# Patient Record
Sex: FEMALE | Race: WHITE | NOT HISPANIC OR LATINO | URBAN - METROPOLITAN AREA
[De-identification: names, ages, dates, MRNs, and addresses within clinical notes are randomized per-mention and may not be internally consistent; named-entity substitution may affect disease eponyms.]

---

## 2017-05-15 ENCOUNTER — OUTPATIENT (OUTPATIENT)
Dept: OUTPATIENT SERVICES | Facility: HOSPITAL | Age: 42
LOS: 1 days | End: 2017-05-15
Payer: COMMERCIAL

## 2017-05-15 ENCOUNTER — APPOINTMENT (OUTPATIENT)
Dept: ULTRASOUND IMAGING | Facility: IMAGING CENTER | Age: 42
End: 2017-05-15

## 2017-05-15 ENCOUNTER — APPOINTMENT (OUTPATIENT)
Dept: MAMMOGRAPHY | Facility: IMAGING CENTER | Age: 42
End: 2017-05-15

## 2017-05-15 DIAGNOSIS — Z00.8 ENCOUNTER FOR OTHER GENERAL EXAMINATION: ICD-10-CM

## 2017-05-15 DIAGNOSIS — Z98.89 OTHER SPECIFIED POSTPROCEDURAL STATES: Chronic | ICD-10-CM

## 2017-05-15 DIAGNOSIS — Z12.31 ENCOUNTER FOR SCREENING MAMMOGRAM FOR MALIGNANT NEOPLASM OF BREAST: ICD-10-CM

## 2017-05-15 DIAGNOSIS — N60.19 DIFFUSE CYSTIC MASTOPATHY OF UNSPECIFIED BREAST: ICD-10-CM

## 2017-05-15 PROCEDURE — 77063 BREAST TOMOSYNTHESIS BI: CPT

## 2017-05-15 PROCEDURE — 77067 SCR MAMMO BI INCL CAD: CPT

## 2017-05-15 PROCEDURE — 76641 ULTRASOUND BREAST COMPLETE: CPT

## 2018-07-18 ENCOUNTER — OUTPATIENT (OUTPATIENT)
Dept: OUTPATIENT SERVICES | Facility: HOSPITAL | Age: 43
LOS: 1 days | Discharge: HOME | End: 2018-07-18

## 2018-07-18 DIAGNOSIS — Z98.89 OTHER SPECIFIED POSTPROCEDURAL STATES: Chronic | ICD-10-CM

## 2018-07-19 DIAGNOSIS — F31.9 BIPOLAR DISORDER, UNSPECIFIED: ICD-10-CM

## 2018-07-19 DIAGNOSIS — F60.3 BORDERLINE PERSONALITY DISORDER: ICD-10-CM

## 2018-08-29 ENCOUNTER — INPATIENT (INPATIENT)
Facility: HOSPITAL | Age: 43
LOS: 8 days | Discharge: HOME | End: 2018-09-07
Attending: PSYCHIATRY & NEUROLOGY | Admitting: PSYCHIATRY & NEUROLOGY
Payer: COMMERCIAL

## 2018-08-29 VITALS
TEMPERATURE: 99 F | SYSTOLIC BLOOD PRESSURE: 149 MMHG | HEART RATE: 144 BPM | OXYGEN SATURATION: 100 % | DIASTOLIC BLOOD PRESSURE: 75 MMHG | RESPIRATION RATE: 16 BRPM

## 2018-08-29 DIAGNOSIS — F31.10 BIPOLAR DISORDER, CURRENT EPISODE MANIC WITHOUT PSYCHOTIC FEATURES, UNSPECIFIED: ICD-10-CM

## 2018-08-29 DIAGNOSIS — Z98.89 OTHER SPECIFIED POSTPROCEDURAL STATES: Chronic | ICD-10-CM

## 2018-08-29 DIAGNOSIS — F31.62 BIPOLAR DISORDER, CURRENT EPISODE MIXED, MODERATE: ICD-10-CM

## 2018-08-29 LAB
ALBUMIN SERPL ELPH-MCNC: 4.2 G/DL — SIGNIFICANT CHANGE UP (ref 3.3–5)
ALP SERPL-CCNC: 110 U/L — SIGNIFICANT CHANGE UP (ref 40–120)
ALT FLD-CCNC: 20 U/L — SIGNIFICANT CHANGE UP (ref 4–33)
AMPHET UR-MCNC: NEGATIVE — SIGNIFICANT CHANGE UP
APAP SERPL-MCNC: < 15 UG/ML — LOW (ref 15–25)
APPEARANCE UR: CLEAR — SIGNIFICANT CHANGE UP
AST SERPL-CCNC: 24 U/L — SIGNIFICANT CHANGE UP (ref 4–32)
BARBITURATES UR SCN-MCNC: NEGATIVE — SIGNIFICANT CHANGE UP
BASOPHILS # BLD AUTO: 0.06 K/UL — SIGNIFICANT CHANGE UP (ref 0–0.2)
BASOPHILS NFR BLD AUTO: 0.4 % — SIGNIFICANT CHANGE UP (ref 0–2)
BENZODIAZ UR-MCNC: NEGATIVE — SIGNIFICANT CHANGE UP
BILIRUB SERPL-MCNC: 0.4 MG/DL — SIGNIFICANT CHANGE UP (ref 0.2–1.2)
BILIRUB UR-MCNC: NEGATIVE — SIGNIFICANT CHANGE UP
BLOOD UR QL VISUAL: NEGATIVE — SIGNIFICANT CHANGE UP
BUN SERPL-MCNC: 13 MG/DL — SIGNIFICANT CHANGE UP (ref 7–23)
CALCIUM SERPL-MCNC: 10.1 MG/DL — SIGNIFICANT CHANGE UP (ref 8.4–10.5)
CANNABINOIDS UR-MCNC: NEGATIVE — SIGNIFICANT CHANGE UP
CHLORIDE SERPL-SCNC: 98 MMOL/L — SIGNIFICANT CHANGE UP (ref 98–107)
CO2 SERPL-SCNC: 25 MMOL/L — SIGNIFICANT CHANGE UP (ref 22–31)
COCAINE METAB.OTHER UR-MCNC: NEGATIVE — SIGNIFICANT CHANGE UP
COLOR SPEC: SIGNIFICANT CHANGE UP
CREAT SERPL-MCNC: 0.88 MG/DL — SIGNIFICANT CHANGE UP (ref 0.5–1.3)
EOSINOPHIL # BLD AUTO: 0.1 K/UL — SIGNIFICANT CHANGE UP (ref 0–0.5)
EOSINOPHIL NFR BLD AUTO: 0.7 % — SIGNIFICANT CHANGE UP (ref 0–6)
ETHANOL BLD-MCNC: < 10 MG/DL — SIGNIFICANT CHANGE UP
GLUCOSE SERPL-MCNC: 122 MG/DL — HIGH (ref 70–99)
GLUCOSE UR-MCNC: NEGATIVE — SIGNIFICANT CHANGE UP
HCG SERPL-ACNC: < 5 MIU/ML — SIGNIFICANT CHANGE UP
HCG UR-SCNC: NEGATIVE — SIGNIFICANT CHANGE UP
HCT VFR BLD CALC: 40 % — SIGNIFICANT CHANGE UP (ref 34.5–45)
HGB BLD-MCNC: 12.7 G/DL — SIGNIFICANT CHANGE UP (ref 11.5–15.5)
IMM GRANULOCYTES # BLD AUTO: 0.13 # — SIGNIFICANT CHANGE UP
IMM GRANULOCYTES NFR BLD AUTO: 0.9 % — SIGNIFICANT CHANGE UP (ref 0–1.5)
KETONES UR-MCNC: NEGATIVE — SIGNIFICANT CHANGE UP
LEUKOCYTE ESTERASE UR-ACNC: NEGATIVE — SIGNIFICANT CHANGE UP
LITHIUM SERPL-MCNC: 1.12 MMOL/L — SIGNIFICANT CHANGE UP (ref 0.6–1.2)
LYMPHOCYTES # BLD AUTO: 17.1 % — SIGNIFICANT CHANGE UP (ref 13–44)
LYMPHOCYTES # BLD AUTO: 2.57 K/UL — SIGNIFICANT CHANGE UP (ref 1–3.3)
MCHC RBC-ENTMCNC: 28.6 PG — SIGNIFICANT CHANGE UP (ref 27–34)
MCHC RBC-ENTMCNC: 31.8 % — LOW (ref 32–36)
MCV RBC AUTO: 90.1 FL — SIGNIFICANT CHANGE UP (ref 80–100)
METHADONE UR-MCNC: NEGATIVE — SIGNIFICANT CHANGE UP
MONOCYTES # BLD AUTO: 0.47 K/UL — SIGNIFICANT CHANGE UP (ref 0–0.9)
MONOCYTES NFR BLD AUTO: 3.1 % — SIGNIFICANT CHANGE UP (ref 2–14)
NEUTROPHILS # BLD AUTO: 11.68 K/UL — HIGH (ref 1.8–7.4)
NEUTROPHILS NFR BLD AUTO: 77.8 % — HIGH (ref 43–77)
NITRITE UR-MCNC: NEGATIVE — SIGNIFICANT CHANGE UP
NRBC # FLD: 0 — SIGNIFICANT CHANGE UP
OPIATES UR-MCNC: NEGATIVE — SIGNIFICANT CHANGE UP
OXYCODONE UR-MCNC: NEGATIVE — SIGNIFICANT CHANGE UP
PCP UR-MCNC: NEGATIVE — SIGNIFICANT CHANGE UP
PH UR: 7 — SIGNIFICANT CHANGE UP (ref 5–8)
PLATELET # BLD AUTO: 443 K/UL — HIGH (ref 150–400)
PMV BLD: 10.4 FL — SIGNIFICANT CHANGE UP (ref 7–13)
POTASSIUM SERPL-MCNC: 3.7 MMOL/L — SIGNIFICANT CHANGE UP (ref 3.5–5.3)
POTASSIUM SERPL-SCNC: 3.7 MMOL/L — SIGNIFICANT CHANGE UP (ref 3.5–5.3)
PROT SERPL-MCNC: 7 G/DL — SIGNIFICANT CHANGE UP (ref 6–8.3)
PROT UR-MCNC: NEGATIVE — SIGNIFICANT CHANGE UP
RBC # BLD: 4.44 M/UL — SIGNIFICANT CHANGE UP (ref 3.8–5.2)
RBC # FLD: 13.2 % — SIGNIFICANT CHANGE UP (ref 10.3–14.5)
SALICYLATES SERPL-MCNC: < 5 MG/DL — LOW (ref 15–30)
SODIUM SERPL-SCNC: 136 MMOL/L — SIGNIFICANT CHANGE UP (ref 135–145)
SP GR SPEC: 1.01 — SIGNIFICANT CHANGE UP (ref 1–1.04)
SP GR UR: 1.01 — SIGNIFICANT CHANGE UP (ref 1–1.03)
TSH SERPL-MCNC: 3.87 UIU/ML — SIGNIFICANT CHANGE UP (ref 0.27–4.2)
UROBILINOGEN FLD QL: NORMAL — SIGNIFICANT CHANGE UP
WBC # BLD: 15.01 K/UL — HIGH (ref 3.8–10.5)
WBC # FLD AUTO: 15.01 K/UL — HIGH (ref 3.8–10.5)

## 2018-08-29 PROCEDURE — 99285 EMERGENCY DEPT VISIT HI MDM: CPT | Mod: GC

## 2018-08-29 PROCEDURE — 99222 1ST HOSP IP/OBS MODERATE 55: CPT

## 2018-08-29 RX ORDER — LAMOTRIGINE 25 MG/1
100 TABLET, ORALLY DISINTEGRATING ORAL DAILY
Qty: 0 | Refills: 0 | Status: DISCONTINUED | OUTPATIENT
Start: 2018-09-02 | End: 2018-09-03

## 2018-08-29 RX ORDER — LAMOTRIGINE 25 MG/1
100 TABLET, ORALLY DISINTEGRATING ORAL DAILY
Qty: 0 | Refills: 0 | Status: DISCONTINUED | OUTPATIENT
Start: 2018-08-29 | End: 2018-09-07

## 2018-08-29 RX ORDER — HYDROXYZINE HCL 10 MG
50 TABLET ORAL EVERY 6 HOURS
Qty: 0 | Refills: 0 | Status: DISCONTINUED | OUTPATIENT
Start: 2018-09-02 | End: 2018-09-07

## 2018-08-29 RX ORDER — LITHIUM CARBONATE 300 MG/1
1200 TABLET, EXTENDED RELEASE ORAL AT BEDTIME
Qty: 0 | Refills: 0 | Status: DISCONTINUED | OUTPATIENT
Start: 2018-08-29 | End: 2018-09-07

## 2018-08-29 RX ORDER — LITHIUM CARBONATE 300 MG/1
1200 TABLET, EXTENDED RELEASE ORAL DAILY
Qty: 0 | Refills: 0 | Status: DISCONTINUED | OUTPATIENT
Start: 2018-09-02 | End: 2018-09-03

## 2018-08-29 RX ORDER — DIPHENHYDRAMINE HCL 50 MG
25 CAPSULE ORAL EVERY 6 HOURS
Qty: 0 | Refills: 0 | Status: DISCONTINUED | OUTPATIENT
Start: 2018-08-29 | End: 2018-09-07

## 2018-08-29 RX ORDER — DIPHENHYDRAMINE HCL 50 MG
50 CAPSULE ORAL EVERY 6 HOURS
Qty: 0 | Refills: 0 | Status: DISCONTINUED | OUTPATIENT
Start: 2018-09-02 | End: 2018-09-07

## 2018-08-29 RX ORDER — HALOPERIDOL DECANOATE 100 MG/ML
5 INJECTION INTRAMUSCULAR ONCE
Qty: 0 | Refills: 0 | Status: DISCONTINUED | OUTPATIENT
Start: 2018-08-29 | End: 2018-09-07

## 2018-08-29 RX ORDER — HALOPERIDOL DECANOATE 100 MG/ML
5 INJECTION INTRAMUSCULAR EVERY 6 HOURS
Qty: 0 | Refills: 0 | Status: DISCONTINUED | OUTPATIENT
Start: 2018-09-02 | End: 2018-09-07

## 2018-08-29 RX ORDER — DIPHENHYDRAMINE HCL 50 MG
50 CAPSULE ORAL EVERY 6 HOURS
Qty: 0 | Refills: 0 | Status: DISCONTINUED | OUTPATIENT
Start: 2018-08-29 | End: 2018-09-07

## 2018-08-29 RX ORDER — DULOXETINE HYDROCHLORIDE 30 MG/1
80 CAPSULE, DELAYED RELEASE ORAL DAILY
Qty: 0 | Refills: 0 | Status: DISCONTINUED | OUTPATIENT
Start: 2018-09-02 | End: 2018-09-03

## 2018-08-29 RX ORDER — OLANZAPINE 15 MG/1
5 TABLET, FILM COATED ORAL AT BEDTIME
Qty: 0 | Refills: 0 | Status: DISCONTINUED | OUTPATIENT
Start: 2018-08-29 | End: 2018-09-04

## 2018-08-29 RX ORDER — ALPRAZOLAM 0.25 MG
0.25 TABLET ORAL ONCE
Qty: 0 | Refills: 0 | Status: DISCONTINUED | OUTPATIENT
Start: 2018-08-29 | End: 2018-08-29

## 2018-08-29 RX ORDER — HALOPERIDOL DECANOATE 100 MG/ML
5 INJECTION INTRAMUSCULAR EVERY 6 HOURS
Qty: 0 | Refills: 0 | Status: DISCONTINUED | OUTPATIENT
Start: 2018-08-29 | End: 2018-08-29

## 2018-08-29 RX ORDER — DULOXETINE HYDROCHLORIDE 30 MG/1
80 CAPSULE, DELAYED RELEASE ORAL DAILY
Qty: 0 | Refills: 0 | Status: DISCONTINUED | OUTPATIENT
Start: 2018-08-29 | End: 2018-09-07

## 2018-08-29 RX ORDER — CLONAZEPAM 1 MG
0.5 TABLET ORAL
Qty: 0 | Refills: 0 | Status: DISCONTINUED | OUTPATIENT
Start: 2018-08-29 | End: 2018-09-01

## 2018-08-29 RX ORDER — SODIUM CHLORIDE 9 MG/ML
1000 INJECTION INTRAMUSCULAR; INTRAVENOUS; SUBCUTANEOUS ONCE
Qty: 0 | Refills: 0 | Status: COMPLETED | OUTPATIENT
Start: 2018-08-29 | End: 2018-08-29

## 2018-08-29 RX ORDER — HALOPERIDOL DECANOATE 100 MG/ML
5 INJECTION INTRAMUSCULAR EVERY 6 HOURS
Qty: 0 | Refills: 0 | Status: DISCONTINUED | OUTPATIENT
Start: 2018-08-29 | End: 2018-09-07

## 2018-08-29 RX ORDER — CLONAZEPAM 1 MG
0.5 TABLET ORAL ONCE
Qty: 0 | Refills: 0 | Status: DISCONTINUED | OUTPATIENT
Start: 2018-08-29 | End: 2018-08-29

## 2018-08-29 RX ADMIN — Medication 0.25 MILLIGRAM(S): at 03:54

## 2018-08-29 RX ADMIN — SODIUM CHLORIDE 1000 MILLILITER(S): 9 INJECTION INTRAMUSCULAR; INTRAVENOUS; SUBCUTANEOUS at 06:08

## 2018-08-29 RX ADMIN — LITHIUM CARBONATE 1200 MILLIGRAM(S): 300 TABLET, EXTENDED RELEASE ORAL at 20:48

## 2018-08-29 RX ADMIN — OLANZAPINE 5 MILLIGRAM(S): 15 TABLET, FILM COATED ORAL at 20:48

## 2018-08-29 RX ADMIN — HALOPERIDOL DECANOATE 5 MILLIGRAM(S): 100 INJECTION INTRAMUSCULAR at 20:48

## 2018-08-29 RX ADMIN — Medication 25 MILLIGRAM(S): at 20:48

## 2018-08-29 RX ADMIN — Medication 0.5 MILLIGRAM(S): at 20:48

## 2018-08-29 RX ADMIN — Medication 0.5 MILLIGRAM(S): at 13:05

## 2018-08-29 RX ADMIN — SODIUM CHLORIDE 1000 MILLILITER(S): 9 INJECTION INTRAMUSCULAR; INTRAVENOUS; SUBCUTANEOUS at 02:35

## 2018-08-29 RX ADMIN — Medication 1 MILLIGRAM(S): at 06:08

## 2018-08-29 NOTE — ED ADULT NURSE REASSESSMENT NOTE - NS ED NURSE REASSESS COMMENT FT1
Report receieved from night RN Benjamin.  at bedside to introduce day RN. Pt is calm, A&O x4 and aware they are waiting for bed at bedside.

## 2018-08-29 NOTE — ED PROVIDER NOTE - CARE PLAN
Principal Discharge DX:	Palpitations Principal Discharge DX:	Palpitations  Secondary Diagnosis:	Anxiety
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
## 2018-08-29 NOTE — ED BEHAVIORAL HEALTH ASSESSMENT NOTE - DESCRIPTION
Patient assessed for overwhelming anxiety. ED gave patient 0.25mg Xanax at 3:54am and Ativan at 06:08am with fluid bolus to address HR of 144bpm. HR has since decreased to 100bpm.  Vital Signs Last 24 Hrs  T(C): 37.3 (29 Aug 2018 06:36), Max: 37.3 (29 Aug 2018 00:10)  T(F): 99.2 (29 Aug 2018 06:36), Max: 99.2 (29 Aug 2018 06:36)  HR: 106 (29 Aug 2018 06:36) (100 - 144)  BP: 122/71 (29 Aug 2018 06:36) (114/81 - 149/75)  BP(mean): --  RR: 16 (29 Aug 2018 06:36) (16 - 16)  SpO2: 98% (29 Aug 2018 06:36) (98% - 100%) hearing loss, uterine polyps deaf since 10 weeks old, employed as mental health counselor, , domiciled w/  and 1 yo son

## 2018-08-29 NOTE — ED ADULT NURSE NOTE - OBJECTIVE STATEMENT
Pt received A&Ox4 to ED Rm 1 with c/o anxiety that has since subsided while being in the ED. States previous feeling of "impending death & like someone was trying to hurt her." States chronic intermit palp worse with anxiety. Denies SI, HI, auditory or visual hallucinations. States hx of bipolar, anxiety on lithium & cymbalta. RR equal & unlabored. Labs sent per MD orders. IV NS infusing well.  at bedside. Will monitor.

## 2018-08-29 NOTE — ED BEHAVIORAL HEALTH ASSESSMENT NOTE - CASE SUMMARY
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 Yari Gibbs (previously AOPD 2016- July 2018), PMHx hearing loss, BIB EMS to [facility] ED after patient called 911 for overwhelming anxiety with sense of death. Patient endorses acute worsening of delusions, high anxiety, and poses an imminent danger to self as she  is unable to care for self due to worsening psychosis, requires inpatient level of care for treatment and stabilization and will admitted 9.13

## 2018-08-29 NOTE — ED BEHAVIORAL HEALTH ASSESSMENT NOTE - SUMMARY
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 Yari Gibbs (previously AOPD 2016- July 2018), PMHx hearing loss, BIB EMS to [facility] ED after patient called 911 for overwhelming anxiety with sense of death. ED gave patient 0.25mg Xanax at 3:54am and Ativan at 06:08am with fluid bolus to address HR of 144bpm.   Patient has overwhelming anxiety 2/2 active psychosis, in the setting of dx’d bipolar 1 disorder with multiple prior hospitalizations. She requires inpatient psychiatric hospitalization for safety, stabilization, and medication management. Patient has insight into her illness and is agreeable to voluntary psychiatric inpatient admission.  - No need for 1:1 observation at this time (patient in good behavioral control and denying SIB/SI/HI)  - Continuing patient on current medication regimen: Lamictal 100mg QD, lithium 1200mg QD, and Cymbalta 80mg QD  - Medication changes per primary psychiatric inpatient team

## 2018-08-29 NOTE — ED BEHAVIORAL HEALTH ASSESSMENT NOTE - RISK ASSESSMENT
Patient at elevated risk due to dx of bipolar d/o and BPD, hx of multiple psych hospitalizations, psychosis despite medication compliance, global insomnia, severe anxiety, and potential hx of trauma. Risk is mitigated by compliance with medications, good therapeutic relationship, lack of hx of SIB/SA/HA, denial of current SI/SIB/HI, good support network, future-orientation, responsibility to family, sobriety, and engagement in employment.

## 2018-08-29 NOTE — ED BEHAVIORAL HEALTH ASSESSMENT NOTE - OTHER PAST PSYCHIATRIC HISTORY (INCLUDE DETAILS REGARDING ONSET, COURSE OF ILLNESS, INPATIENT/OUTPATIENT TREATMENT)
Bipolar D/O & BPD, no hx SA/SIB/HA, 2 past psychiatric hospitalizations (most recent 2016 [facility] 2W 08/01/2016-08/09/2016, post-partum manic episode), currently followed at outpatient [facility] bipolar clinic by Dr. Yari Gibbs (previously AOPD 2016- July 2018)

## 2018-08-29 NOTE — ED ADULT NURSE NOTE - NSIMPLEMENTINTERV_GEN_ALL_ED
Implemented All Universal Safety Interventions:  [place] to call system. Call bell, personal items and telephone within reach. Instruct patient to call for assistance. Room bathroom lighting operational. Non-slip footwear when patient is off stretcher. Physically safe environment: no spills, clutter or unnecessary equipment. Stretcher in lowest position, wheels locked, appropriate side rails in place.

## 2018-08-29 NOTE — ED PROVIDER NOTE - PMH
Bipolar affective disorder    Depression    Hearing impaired    Hearing loss sensory, bilateral    Polyp  of uterus

## 2018-08-29 NOTE — ED PROVIDER NOTE - ATTENDING CONTRIBUTION TO CARE
42F PMH deaf, bipolar, anxiety p/w sensation of feeling very anxious, scared, felt like she was going to die. Also felt like heart was racing. Also felt "irrational and delusional" but cant explain more. States she has felt similar in the past and ultimately required psych admission. Currently feeling much better and only currently feels very minimal anxiety. Tachycardic, other vitals wnl, exam as above. Well appearing. EKG sinus tach.   ddx: HR likely 2/2 anxiety vs. metabolic vs. dehydration  CBC, cmp, TSH, tox, hcg, lithium level. IVF, reassess.  Pt really wants to speak to [facility], especially because she ended up getting admitted w/ similar presentations. Likely medical clearance for further  eval.

## 2018-08-29 NOTE — ED ADULT TRIAGE NOTE - CHIEF COMPLAINT QUOTE
Pt arrives w/ c/o increased anxiety and overwhelming feelings of being in danger. Rpts last time this occurred she went into psychosis. Pt w/ history of bipolar disorder. Recent dose increase in lithium.  Denies SI/HI. Pt deaf w/ left ear hearing aide. Lip reader. .

## 2018-08-29 NOTE — ED BEHAVIORAL HEALTH ASSESSMENT NOTE - SUICIDE PROTECTIVE FACTORS
Responsibility to family and others/Positive therapeutic relationships/Future oriented/Fear of death or dying due to pain/suffering/Engaged in work or school/Identifies reasons for living/Supportive social network or family
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
## 2018-08-29 NOTE — ED BEHAVIORAL HEALTH ASSESSMENT NOTE - OTHER
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

## 2018-08-29 NOTE — ED PROVIDER NOTE - PROGRESS NOTE DETAILS
[place]: LAbs grossly wnl. Feeling much better, HR improved. Medically cleared for further BH eval. [place]: MC. Slightly more anxious. rediscussed w/ BH, still awaiting eval. Lina: Pt was signed out to me pending psych c/s. Pt is medically cleared. Hx of anxiety, psychosis, bipolar. Here with anxiety, pending psych eval. JOVANA Kee; Pt signed out to JOVANA kee from JOVANA culver, pt stable, resting at bedside, no other changes.

## 2018-08-29 NOTE — ED PROVIDER NOTE - PHYSICAL EXAMINATION
No clonus, rigidity, tremors, fasciculations. PERRL, EOMI, no nystagmus. Strength 5/5. Steady unassisted gait. Normal bowel sounds, skin temp/color.   [place]  no LE edema, normal equal distal pulses.

## 2018-08-29 NOTE — ED BEHAVIORAL HEALTH ASSESSMENT NOTE - PSYCHIATRIC ISSUES AND PLAN (INCLUDE STANDING AND PRN MEDICATION)
Continuing patient on current medication regimen: Lamictal 100mg QD, lithium 1200mg QD, and Cymbalta 80mg. PRN Benadryl 50mg (PO/IM), PRN Haldol 5mg (PO/IM) and PRN Ativan 2mg (PO/IM) available for management of severe agitation

## 2018-08-30 PROCEDURE — 99232 SBSQ HOSP IP/OBS MODERATE 35: CPT

## 2018-08-30 RX ORDER — TRAZODONE HCL 50 MG
50 TABLET ORAL AT BEDTIME
Qty: 0 | Refills: 0 | Status: DISCONTINUED | OUTPATIENT
Start: 2018-08-30 | End: 2018-08-31

## 2018-08-30 RX ADMIN — Medication 0.5 MILLIGRAM(S): at 09:55

## 2018-08-30 RX ADMIN — LAMOTRIGINE 100 MILLIGRAM(S): 25 TABLET, ORALLY DISINTEGRATING ORAL at 09:55

## 2018-08-30 RX ADMIN — LITHIUM CARBONATE 1200 MILLIGRAM(S): 300 TABLET, EXTENDED RELEASE ORAL at 21:24

## 2018-08-30 RX ADMIN — OLANZAPINE 5 MILLIGRAM(S): 15 TABLET, FILM COATED ORAL at 21:24

## 2018-08-30 RX ADMIN — DULOXETINE HYDROCHLORIDE 80 MILLIGRAM(S): 30 CAPSULE, DELAYED RELEASE ORAL at 09:55

## 2018-08-30 RX ADMIN — Medication 50 MILLIGRAM(S): at 21:24

## 2018-08-30 RX ADMIN — Medication 0.5 MILLIGRAM(S): at 21:24

## 2018-08-31 PROCEDURE — 90853 GROUP PSYCHOTHERAPY: CPT

## 2018-08-31 PROCEDURE — 90837 PSYTX W PT 60 MINUTES: CPT | Mod: 59

## 2018-08-31 PROCEDURE — 99232 SBSQ HOSP IP/OBS MODERATE 35: CPT | Mod: 25

## 2018-08-31 RX ORDER — TRAZODONE HCL 50 MG
50 TABLET ORAL AT BEDTIME
Qty: 0 | Refills: 0 | Status: DISCONTINUED | OUTPATIENT
Start: 2018-08-31 | End: 2018-09-07

## 2018-08-31 RX ADMIN — OLANZAPINE 5 MILLIGRAM(S): 15 TABLET, FILM COATED ORAL at 21:04

## 2018-08-31 RX ADMIN — Medication 0.5 MILLIGRAM(S): at 08:25

## 2018-08-31 RX ADMIN — LITHIUM CARBONATE 1200 MILLIGRAM(S): 300 TABLET, EXTENDED RELEASE ORAL at 21:04

## 2018-08-31 RX ADMIN — DULOXETINE HYDROCHLORIDE 80 MILLIGRAM(S): 30 CAPSULE, DELAYED RELEASE ORAL at 08:25

## 2018-08-31 RX ADMIN — Medication 0.5 MILLIGRAM(S): at 21:04

## 2018-08-31 RX ADMIN — LAMOTRIGINE 100 MILLIGRAM(S): 25 TABLET, ORALLY DISINTEGRATING ORAL at 08:25

## 2018-09-01 PROCEDURE — 99232 SBSQ HOSP IP/OBS MODERATE 35: CPT

## 2018-09-01 RX ORDER — CLONAZEPAM 1 MG
0.5 TABLET ORAL AT BEDTIME
Qty: 0 | Refills: 0 | Status: DISCONTINUED | OUTPATIENT
Start: 2018-09-01 | End: 2018-09-07

## 2018-09-01 RX ADMIN — OLANZAPINE 5 MILLIGRAM(S): 15 TABLET, FILM COATED ORAL at 21:39

## 2018-09-01 RX ADMIN — DULOXETINE HYDROCHLORIDE 80 MILLIGRAM(S): 30 CAPSULE, DELAYED RELEASE ORAL at 08:59

## 2018-09-01 RX ADMIN — LAMOTRIGINE 100 MILLIGRAM(S): 25 TABLET, ORALLY DISINTEGRATING ORAL at 08:59

## 2018-09-01 RX ADMIN — Medication 0.5 MILLIGRAM(S): at 21:39

## 2018-09-01 RX ADMIN — LITHIUM CARBONATE 1200 MILLIGRAM(S): 300 TABLET, EXTENDED RELEASE ORAL at 21:39

## 2018-09-01 RX ADMIN — Medication 0.5 MILLIGRAM(S): at 08:59

## 2018-09-02 PROCEDURE — 99232 SBSQ HOSP IP/OBS MODERATE 35: CPT

## 2018-09-02 RX ADMIN — Medication 30 MILLILITER(S): at 00:00

## 2018-09-02 RX ADMIN — LAMOTRIGINE 100 MILLIGRAM(S): 25 TABLET, ORALLY DISINTEGRATING ORAL at 09:42

## 2018-09-02 RX ADMIN — DULOXETINE HYDROCHLORIDE 80 MILLIGRAM(S): 30 CAPSULE, DELAYED RELEASE ORAL at 09:42

## 2018-09-02 RX ADMIN — Medication 0.5 MILLIGRAM(S): at 21:01

## 2018-09-02 RX ADMIN — LITHIUM CARBONATE 1200 MILLIGRAM(S): 300 TABLET, EXTENDED RELEASE ORAL at 21:01

## 2018-09-02 RX ADMIN — Medication 2 MILLIGRAM(S): at 01:07

## 2018-09-02 RX ADMIN — OLANZAPINE 5 MILLIGRAM(S): 15 TABLET, FILM COATED ORAL at 21:01

## 2018-09-03 PROCEDURE — 99232 SBSQ HOSP IP/OBS MODERATE 35: CPT

## 2018-09-03 RX ADMIN — Medication 2 MILLIGRAM(S): at 20:19

## 2018-09-03 RX ADMIN — OLANZAPINE 5 MILLIGRAM(S): 15 TABLET, FILM COATED ORAL at 21:31

## 2018-09-03 RX ADMIN — LITHIUM CARBONATE 1200 MILLIGRAM(S): 300 TABLET, EXTENDED RELEASE ORAL at 21:31

## 2018-09-03 RX ADMIN — LAMOTRIGINE 100 MILLIGRAM(S): 25 TABLET, ORALLY DISINTEGRATING ORAL at 09:19

## 2018-09-03 RX ADMIN — Medication 0.5 MILLIGRAM(S): at 21:31

## 2018-09-03 RX ADMIN — DULOXETINE HYDROCHLORIDE 80 MILLIGRAM(S): 30 CAPSULE, DELAYED RELEASE ORAL at 09:19

## 2018-09-04 PROCEDURE — 99232 SBSQ HOSP IP/OBS MODERATE 35: CPT

## 2018-09-04 RX ORDER — CLONAZEPAM 1 MG
0.5 TABLET ORAL ONCE
Qty: 0 | Refills: 0 | Status: DISCONTINUED | OUTPATIENT
Start: 2018-09-04 | End: 2018-09-04

## 2018-09-04 RX ORDER — OLANZAPINE 15 MG/1
7.5 TABLET, FILM COATED ORAL AT BEDTIME
Qty: 0 | Refills: 0 | Status: DISCONTINUED | OUTPATIENT
Start: 2018-09-04 | End: 2018-09-07

## 2018-09-04 RX ADMIN — LITHIUM CARBONATE 1200 MILLIGRAM(S): 300 TABLET, EXTENDED RELEASE ORAL at 20:50

## 2018-09-04 RX ADMIN — Medication 0.5 MILLIGRAM(S): at 12:12

## 2018-09-04 RX ADMIN — Medication 0.5 MILLIGRAM(S): at 20:50

## 2018-09-04 RX ADMIN — OLANZAPINE 7.5 MILLIGRAM(S): 15 TABLET, FILM COATED ORAL at 20:50

## 2018-09-04 RX ADMIN — Medication 2 MILLIGRAM(S): at 22:43

## 2018-09-04 RX ADMIN — DULOXETINE HYDROCHLORIDE 80 MILLIGRAM(S): 30 CAPSULE, DELAYED RELEASE ORAL at 10:15

## 2018-09-04 RX ADMIN — LAMOTRIGINE 100 MILLIGRAM(S): 25 TABLET, ORALLY DISINTEGRATING ORAL at 10:15

## 2018-09-05 PROCEDURE — 99232 SBSQ HOSP IP/OBS MODERATE 35: CPT

## 2018-09-05 RX ORDER — CLONAZEPAM 1 MG
0.5 TABLET ORAL DAILY
Qty: 0 | Refills: 0 | Status: DISCONTINUED | OUTPATIENT
Start: 2018-09-05 | End: 2018-09-07

## 2018-09-05 RX ADMIN — Medication 0.5 MILLIGRAM(S): at 22:04

## 2018-09-05 RX ADMIN — DULOXETINE HYDROCHLORIDE 80 MILLIGRAM(S): 30 CAPSULE, DELAYED RELEASE ORAL at 08:39

## 2018-09-05 RX ADMIN — LAMOTRIGINE 100 MILLIGRAM(S): 25 TABLET, ORALLY DISINTEGRATING ORAL at 08:39

## 2018-09-05 RX ADMIN — LITHIUM CARBONATE 1200 MILLIGRAM(S): 300 TABLET, EXTENDED RELEASE ORAL at 22:04

## 2018-09-05 RX ADMIN — OLANZAPINE 7.5 MILLIGRAM(S): 15 TABLET, FILM COATED ORAL at 22:04

## 2018-09-05 RX ADMIN — Medication 2 MILLIGRAM(S): at 23:25

## 2018-09-06 LAB
CHOLEST SERPL-MCNC: 208 MG/DL — HIGH (ref 120–199)
HDLC SERPL-MCNC: 44 MG/DL — LOW (ref 45–65)
LIPID PNL WITH DIRECT LDL SERPL: 143 MG/DL — SIGNIFICANT CHANGE UP
TRIGL SERPL-MCNC: 188 MG/DL — HIGH (ref 10–149)

## 2018-09-06 PROCEDURE — 99232 SBSQ HOSP IP/OBS MODERATE 35: CPT

## 2018-09-06 RX ORDER — LAMOTRIGINE 25 MG/1
1 TABLET, ORALLY DISINTEGRATING ORAL
Qty: 14 | Refills: 0 | OUTPATIENT
Start: 2018-09-06 | End: 2018-09-19

## 2018-09-06 RX ORDER — DOCUSATE SODIUM 100 MG
100 CAPSULE ORAL DAILY
Qty: 0 | Refills: 0 | Status: DISCONTINUED | OUTPATIENT
Start: 2018-09-06 | End: 2018-09-07

## 2018-09-06 RX ORDER — CLONAZEPAM 1 MG
1 TABLET ORAL
Qty: 14 | Refills: 0 | OUTPATIENT
Start: 2018-09-06 | End: 2018-09-19

## 2018-09-06 RX ORDER — LAMOTRIGINE 25 MG/1
2 TABLET, ORALLY DISINTEGRATING ORAL
Qty: 0 | Refills: 0 | COMMUNITY
Start: 2018-09-06 | End: 2018-09-19

## 2018-09-06 RX ORDER — LITHIUM CARBONATE 300 MG/1
2 TABLET, EXTENDED RELEASE ORAL
Qty: 28 | Refills: 0 | OUTPATIENT
Start: 2018-09-06 | End: 2018-09-19

## 2018-09-06 RX ORDER — DULOXETINE HYDROCHLORIDE 30 MG/1
2 CAPSULE, DELAYED RELEASE ORAL
Qty: 28 | Refills: 0 | OUTPATIENT
Start: 2018-09-06 | End: 2018-09-19

## 2018-09-06 RX ORDER — OLANZAPINE 15 MG/1
1 TABLET, FILM COATED ORAL
Qty: 14 | Refills: 0 | OUTPATIENT
Start: 2018-09-06 | End: 2018-09-19

## 2018-09-06 RX ADMIN — DULOXETINE HYDROCHLORIDE 80 MILLIGRAM(S): 30 CAPSULE, DELAYED RELEASE ORAL at 09:04

## 2018-09-06 RX ADMIN — LAMOTRIGINE 100 MILLIGRAM(S): 25 TABLET, ORALLY DISINTEGRATING ORAL at 09:04

## 2018-09-06 RX ADMIN — LITHIUM CARBONATE 1200 MILLIGRAM(S): 300 TABLET, EXTENDED RELEASE ORAL at 20:50

## 2018-09-06 RX ADMIN — Medication 0.5 MILLIGRAM(S): at 17:16

## 2018-09-06 RX ADMIN — Medication 2 MILLIGRAM(S): at 23:28

## 2018-09-06 RX ADMIN — Medication 100 MILLIGRAM(S): at 23:28

## 2018-09-06 RX ADMIN — OLANZAPINE 7.5 MILLIGRAM(S): 15 TABLET, FILM COATED ORAL at 20:50

## 2018-09-06 RX ADMIN — Medication 0.5 MILLIGRAM(S): at 20:50

## 2018-09-07 VITALS — TEMPERATURE: 98 F | RESPIRATION RATE: 20 BRPM

## 2018-09-07 PROCEDURE — 99239 HOSP IP/OBS DSCHRG MGMT >30: CPT | Mod: 25

## 2018-09-07 PROCEDURE — 90853 GROUP PSYCHOTHERAPY: CPT

## 2018-09-07 RX ORDER — OMEGA-3 ACID ETHYL ESTERS 1 G
1 CAPSULE ORAL
Qty: 0 | Refills: 0 | Status: DISCONTINUED | OUTPATIENT
Start: 2018-09-07 | End: 2018-09-07

## 2018-09-07 RX ORDER — OMEGA-3 ACID ETHYL ESTERS 1 G
1 CAPSULE ORAL
Qty: 28 | Refills: 0 | OUTPATIENT
Start: 2018-09-07 | End: 2018-09-20

## 2018-09-07 RX ADMIN — Medication 1 GRAM(S): at 09:50

## 2018-09-07 RX ADMIN — LAMOTRIGINE 100 MILLIGRAM(S): 25 TABLET, ORALLY DISINTEGRATING ORAL at 08:49

## 2018-09-07 RX ADMIN — Medication 30 MILLILITER(S): at 00:04

## 2018-09-07 RX ADMIN — DULOXETINE HYDROCHLORIDE 80 MILLIGRAM(S): 30 CAPSULE, DELAYED RELEASE ORAL at 08:49

## 2018-09-10 ENCOUNTER — OUTPATIENT (OUTPATIENT)
Dept: OUTPATIENT SERVICES | Facility: HOSPITAL | Age: 43
LOS: 1 days | Discharge: HOME | End: 2018-09-10

## 2018-09-10 DIAGNOSIS — Z98.89 OTHER SPECIFIED POSTPROCEDURAL STATES: Chronic | ICD-10-CM

## 2018-09-17 ENCOUNTER — APPOINTMENT (OUTPATIENT)
Dept: MAMMOGRAPHY | Facility: IMAGING CENTER | Age: 43
End: 2018-09-17
Payer: COMMERCIAL

## 2018-09-17 ENCOUNTER — OUTPATIENT (OUTPATIENT)
Dept: OUTPATIENT SERVICES | Facility: HOSPITAL | Age: 43
LOS: 1 days | End: 2018-09-17
Payer: COMMERCIAL

## 2018-09-17 ENCOUNTER — APPOINTMENT (OUTPATIENT)
Dept: ULTRASOUND IMAGING | Facility: IMAGING CENTER | Age: 43
End: 2018-09-17
Payer: COMMERCIAL

## 2018-09-17 DIAGNOSIS — Z12.31 ENCOUNTER FOR SCREENING MAMMOGRAM FOR MALIGNANT NEOPLASM OF BREAST: ICD-10-CM

## 2018-09-17 DIAGNOSIS — N60.19 DIFFUSE CYSTIC MASTOPATHY OF UNSPECIFIED BREAST: ICD-10-CM

## 2018-09-17 DIAGNOSIS — Z98.89 OTHER SPECIFIED POSTPROCEDURAL STATES: Chronic | ICD-10-CM

## 2018-09-17 PROCEDURE — 76641 ULTRASOUND BREAST COMPLETE: CPT

## 2018-09-17 PROCEDURE — 77067 SCR MAMMO BI INCL CAD: CPT | Mod: 26

## 2018-09-17 PROCEDURE — 77067 SCR MAMMO BI INCL CAD: CPT

## 2018-09-17 PROCEDURE — 77063 BREAST TOMOSYNTHESIS BI: CPT

## 2018-09-17 PROCEDURE — 77063 BREAST TOMOSYNTHESIS BI: CPT | Mod: 26

## 2018-09-17 PROCEDURE — 76641 ULTRASOUND BREAST COMPLETE: CPT | Mod: 26,50

## 2018-10-02 DIAGNOSIS — F32.9 MAJOR DEPRESSIVE DISORDER, SINGLE EPISODE, UNSPECIFIED: ICD-10-CM

## 2019-03-08 ENCOUNTER — OUTPATIENT (OUTPATIENT)
Dept: OUTPATIENT SERVICES | Facility: HOSPITAL | Age: 44
LOS: 1 days | Discharge: ANOTHER HOSPITAL | End: 2019-03-08

## 2019-03-08 DIAGNOSIS — Z98.89 OTHER SPECIFIED POSTPROCEDURAL STATES: Chronic | ICD-10-CM

## 2019-03-11 DIAGNOSIS — F31.71 BIPOLAR DISORDER, IN PARTIAL REMISSION, MOST RECENT EPISODE HYPOMANIC: ICD-10-CM

## 2019-03-12 LAB
ALBUMIN SERPL ELPH-MCNC: 4 G/DL — SIGNIFICANT CHANGE UP (ref 3.3–5)
ALP SERPL-CCNC: 89 U/L — SIGNIFICANT CHANGE UP (ref 40–120)
ALT FLD-CCNC: 8 U/L — SIGNIFICANT CHANGE UP (ref 4–33)
ANION GAP SERPL CALC-SCNC: 12 MMO/L — SIGNIFICANT CHANGE UP (ref 7–14)
APPEARANCE UR: CLEAR — SIGNIFICANT CHANGE UP
AST SERPL-CCNC: 13 U/L — SIGNIFICANT CHANGE UP (ref 4–32)
BASOPHILS # BLD AUTO: 0.06 K/UL — SIGNIFICANT CHANGE UP (ref 0–0.2)
BASOPHILS NFR BLD AUTO: 0.5 % — SIGNIFICANT CHANGE UP (ref 0–2)
BILIRUB SERPL-MCNC: 0.4 MG/DL — SIGNIFICANT CHANGE UP (ref 0.2–1.2)
BILIRUB UR-MCNC: NEGATIVE — SIGNIFICANT CHANGE UP
BLOOD UR QL VISUAL: NEGATIVE — SIGNIFICANT CHANGE UP
BUN SERPL-MCNC: 11 MG/DL — SIGNIFICANT CHANGE UP (ref 7–23)
CALCIUM SERPL-MCNC: 9.6 MG/DL — SIGNIFICANT CHANGE UP (ref 8.4–10.5)
CHLORIDE SERPL-SCNC: 101 MMOL/L — SIGNIFICANT CHANGE UP (ref 98–107)
CHOLEST SERPL-MCNC: 205 MG/DL — HIGH (ref 120–199)
CO2 SERPL-SCNC: 24 MMOL/L — SIGNIFICANT CHANGE UP (ref 22–31)
COLOR SPEC: SIGNIFICANT CHANGE UP
CREAT SERPL-MCNC: 0.98 MG/DL — SIGNIFICANT CHANGE UP (ref 0.5–1.3)
EOSINOPHIL # BLD AUTO: 0.58 K/UL — HIGH (ref 0–0.5)
EOSINOPHIL NFR BLD AUTO: 4.7 % — SIGNIFICANT CHANGE UP (ref 0–6)
GLUCOSE SERPL-MCNC: 118 MG/DL — HIGH (ref 70–99)
GLUCOSE UR-MCNC: NEGATIVE — SIGNIFICANT CHANGE UP
HBA1C BLD-MCNC: 5.2 % — SIGNIFICANT CHANGE UP (ref 4–5.6)
HCT VFR BLD CALC: 39.9 % — SIGNIFICANT CHANGE UP (ref 34.5–45)
HDLC SERPL-MCNC: 42 MG/DL — LOW (ref 45–65)
HGB BLD-MCNC: 12.2 G/DL — SIGNIFICANT CHANGE UP (ref 11.5–15.5)
IMM GRANULOCYTES NFR BLD AUTO: 0.4 % — SIGNIFICANT CHANGE UP (ref 0–1.5)
KETONES UR-MCNC: NEGATIVE — SIGNIFICANT CHANGE UP
LEUKOCYTE ESTERASE UR-ACNC: NEGATIVE — SIGNIFICANT CHANGE UP
LIPID PNL WITH DIRECT LDL SERPL: 148 MG/DL — SIGNIFICANT CHANGE UP
LITHIUM SERPL-MCNC: 0.73 MMOL/L — SIGNIFICANT CHANGE UP (ref 0.6–1.2)
LYMPHOCYTES # BLD AUTO: 2.95 K/UL — SIGNIFICANT CHANGE UP (ref 1–3.3)
LYMPHOCYTES # BLD AUTO: 24 % — SIGNIFICANT CHANGE UP (ref 13–44)
MCHC RBC-ENTMCNC: 28.3 PG — SIGNIFICANT CHANGE UP (ref 27–34)
MCHC RBC-ENTMCNC: 30.6 % — LOW (ref 32–36)
MCV RBC AUTO: 92.6 FL — SIGNIFICANT CHANGE UP (ref 80–100)
MONOCYTES # BLD AUTO: 0.42 K/UL — SIGNIFICANT CHANGE UP (ref 0–0.9)
MONOCYTES NFR BLD AUTO: 3.4 % — SIGNIFICANT CHANGE UP (ref 2–14)
NEUTROPHILS # BLD AUTO: 8.21 K/UL — HIGH (ref 1.8–7.4)
NEUTROPHILS NFR BLD AUTO: 67 % — SIGNIFICANT CHANGE UP (ref 43–77)
NITRITE UR-MCNC: NEGATIVE — SIGNIFICANT CHANGE UP
NRBC # FLD: 0 K/UL — LOW (ref 25–125)
PH UR: 6.5 — SIGNIFICANT CHANGE UP (ref 5–8)
PLATELET # BLD AUTO: 385 K/UL — SIGNIFICANT CHANGE UP (ref 150–400)
PMV BLD: 9.7 FL — SIGNIFICANT CHANGE UP (ref 7–13)
POTASSIUM SERPL-MCNC: 3.7 MMOL/L — SIGNIFICANT CHANGE UP (ref 3.5–5.3)
POTASSIUM SERPL-SCNC: 3.7 MMOL/L — SIGNIFICANT CHANGE UP (ref 3.5–5.3)
PROT SERPL-MCNC: 6.8 G/DL — SIGNIFICANT CHANGE UP (ref 6–8.3)
PROT UR-MCNC: NEGATIVE — SIGNIFICANT CHANGE UP
RBC # BLD: 4.31 M/UL — SIGNIFICANT CHANGE UP (ref 3.8–5.2)
RBC # FLD: 13.7 % — SIGNIFICANT CHANGE UP (ref 10.3–14.5)
SODIUM SERPL-SCNC: 137 MMOL/L — SIGNIFICANT CHANGE UP (ref 135–145)
SP GR SPEC: 1.01 — SIGNIFICANT CHANGE UP (ref 1–1.04)
TRIGL SERPL-MCNC: 271 MG/DL — HIGH (ref 10–149)
TSH SERPL-MCNC: 4.09 UIU/ML — SIGNIFICANT CHANGE UP (ref 0.27–4.2)
UROBILINOGEN FLD QL: NORMAL — SIGNIFICANT CHANGE UP
WBC # BLD: 12.27 K/UL — HIGH (ref 3.8–10.5)
WBC # FLD AUTO: 12.27 K/UL — HIGH (ref 3.8–10.5)

## 2019-03-29 ENCOUNTER — INPATIENT (INPATIENT)
Facility: HOSPITAL | Age: 44
LOS: 11 days | Discharge: HOME | End: 2019-04-10
Attending: PSYCHIATRY & NEUROLOGY | Admitting: PSYCHIATRY & NEUROLOGY
Payer: COMMERCIAL

## 2019-03-29 VITALS
OXYGEN SATURATION: 100 % | RESPIRATION RATE: 18 BRPM | SYSTOLIC BLOOD PRESSURE: 143 MMHG | DIASTOLIC BLOOD PRESSURE: 72 MMHG | TEMPERATURE: 98 F | HEART RATE: 109 BPM

## 2019-03-29 DIAGNOSIS — F32.9 MAJOR DEPRESSIVE DISORDER, SINGLE EPISODE, UNSPECIFIED: ICD-10-CM

## 2019-03-29 DIAGNOSIS — Z98.89 OTHER SPECIFIED POSTPROCEDURAL STATES: Chronic | ICD-10-CM

## 2019-03-29 LAB
ALBUMIN SERPL ELPH-MCNC: 4.4 G/DL — SIGNIFICANT CHANGE UP (ref 3.3–5)
ALP SERPL-CCNC: 98 U/L — SIGNIFICANT CHANGE UP (ref 40–120)
ALT FLD-CCNC: 10 U/L — SIGNIFICANT CHANGE UP (ref 4–33)
ANION GAP SERPL CALC-SCNC: 12 MMO/L — SIGNIFICANT CHANGE UP (ref 7–14)
APAP SERPL-MCNC: < 15 UG/ML — LOW (ref 15–25)
AST SERPL-CCNC: 17 U/L — SIGNIFICANT CHANGE UP (ref 4–32)
BASOPHILS # BLD AUTO: 0.08 K/UL — SIGNIFICANT CHANGE UP (ref 0–0.2)
BASOPHILS NFR BLD AUTO: 0.5 % — SIGNIFICANT CHANGE UP (ref 0–2)
BILIRUB SERPL-MCNC: 0.4 MG/DL — SIGNIFICANT CHANGE UP (ref 0.2–1.2)
BUN SERPL-MCNC: 13 MG/DL — SIGNIFICANT CHANGE UP (ref 7–23)
CALCIUM SERPL-MCNC: 9.7 MG/DL — SIGNIFICANT CHANGE UP (ref 8.4–10.5)
CHLORIDE SERPL-SCNC: 104 MMOL/L — SIGNIFICANT CHANGE UP (ref 98–107)
CO2 SERPL-SCNC: 26 MMOL/L — SIGNIFICANT CHANGE UP (ref 22–31)
CREAT SERPL-MCNC: 1.03 MG/DL — SIGNIFICANT CHANGE UP (ref 0.5–1.3)
EOSINOPHIL # BLD AUTO: 0.27 K/UL — SIGNIFICANT CHANGE UP (ref 0–0.5)
EOSINOPHIL NFR BLD AUTO: 1.7 % — SIGNIFICANT CHANGE UP (ref 0–6)
ETHANOL BLD-MCNC: < 10 MG/DL — SIGNIFICANT CHANGE UP
GLUCOSE SERPL-MCNC: 114 MG/DL — HIGH (ref 70–99)
HCT VFR BLD CALC: 41.2 % — SIGNIFICANT CHANGE UP (ref 34.5–45)
HGB BLD-MCNC: 12.9 G/DL — SIGNIFICANT CHANGE UP (ref 11.5–15.5)
IMM GRANULOCYTES NFR BLD AUTO: 0.9 % — SIGNIFICANT CHANGE UP (ref 0–1.5)
LITHIUM SERPL-MCNC: 0.48 MMOL/L — LOW (ref 0.6–1.2)
LYMPHOCYTES # BLD AUTO: 22.5 % — SIGNIFICANT CHANGE UP (ref 13–44)
LYMPHOCYTES # BLD AUTO: 3.67 K/UL — HIGH (ref 1–3.3)
MCHC RBC-ENTMCNC: 28.5 PG — SIGNIFICANT CHANGE UP (ref 27–34)
MCHC RBC-ENTMCNC: 31.3 % — LOW (ref 32–36)
MCV RBC AUTO: 90.9 FL — SIGNIFICANT CHANGE UP (ref 80–100)
MONOCYTES # BLD AUTO: 0.48 K/UL — SIGNIFICANT CHANGE UP (ref 0–0.9)
MONOCYTES NFR BLD AUTO: 2.9 % — SIGNIFICANT CHANGE UP (ref 2–14)
NEUTROPHILS # BLD AUTO: 11.64 K/UL — HIGH (ref 1.8–7.4)
NEUTROPHILS NFR BLD AUTO: 71.5 % — SIGNIFICANT CHANGE UP (ref 43–77)
NRBC # FLD: 0 K/UL — SIGNIFICANT CHANGE UP (ref 0–0)
PLATELET # BLD AUTO: 422 K/UL — HIGH (ref 150–400)
PMV BLD: 9.3 FL — SIGNIFICANT CHANGE UP (ref 7–13)
POTASSIUM SERPL-MCNC: 4 MMOL/L — SIGNIFICANT CHANGE UP (ref 3.5–5.3)
POTASSIUM SERPL-SCNC: 4 MMOL/L — SIGNIFICANT CHANGE UP (ref 3.5–5.3)
PROT SERPL-MCNC: 7.5 G/DL — SIGNIFICANT CHANGE UP (ref 6–8.3)
RBC # BLD: 4.53 M/UL — SIGNIFICANT CHANGE UP (ref 3.8–5.2)
RBC # FLD: 13.7 % — SIGNIFICANT CHANGE UP (ref 10.3–14.5)
SALICYLATES SERPL-MCNC: < 5 MG/DL — LOW (ref 15–30)
SODIUM SERPL-SCNC: 142 MMOL/L — SIGNIFICANT CHANGE UP (ref 135–145)
TSH SERPL-MCNC: 2.73 UIU/ML — SIGNIFICANT CHANGE UP (ref 0.27–4.2)
WBC # BLD: 16.29 K/UL — HIGH (ref 3.8–10.5)
WBC # FLD AUTO: 16.29 K/UL — HIGH (ref 3.8–10.5)

## 2019-03-29 PROCEDURE — 99285 EMERGENCY DEPT VISIT HI MDM: CPT | Mod: GC

## 2019-03-29 RX ORDER — CLONAZEPAM 1 MG
1 TABLET ORAL
Qty: 0 | Refills: 0 | Status: DISCONTINUED | OUTPATIENT
Start: 2019-03-30 | End: 2019-04-01

## 2019-03-29 RX ORDER — LITHIUM CARBONATE 300 MG/1
1200 TABLET, EXTENDED RELEASE ORAL AT BEDTIME
Qty: 0 | Refills: 0 | Status: DISCONTINUED | OUTPATIENT
Start: 2019-03-30 | End: 2019-04-01

## 2019-03-29 RX ORDER — QUETIAPINE FUMARATE 200 MG/1
350 TABLET, FILM COATED ORAL AT BEDTIME
Qty: 0 | Refills: 0 | Status: DISCONTINUED | OUTPATIENT
Start: 2019-03-30 | End: 2019-04-01

## 2019-03-29 RX ORDER — LAMOTRIGINE 25 MG/1
200 TABLET, ORALLY DISINTEGRATING ORAL DAILY
Qty: 0 | Refills: 0 | Status: DISCONTINUED | OUTPATIENT
Start: 2019-03-30 | End: 2019-04-01

## 2019-03-29 NOTE — ED BEHAVIORAL HEALTH ASSESSMENT NOTE - PSYCHIATRIC ISSUES AND PLAN (INCLUDE STANDING AND PRN MEDICATION)
Continuing patient on current medication regimen: Lamictal 100mg QD, lithium 1200mg QD, and Cymbalta 80mg. PRN Benadryl 50mg (PO/IM), PRN Haldol 5mg (PO/IM) and PRN Ativan 2mg (PO/IM) available for management of severe agitation see above
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
## 2019-03-29 NOTE — ED ADULT NURSE NOTE - CHIEF COMPLAINT QUOTE
Presents with c/o anxiety on and off since November, "really bad since January.  Presents for anxiety for 5 weeks and was treated for 3 weeks at [facility]. Depression since November.  also having panic attacks.  Pt hearing impaired, offered , but as per pt "I'm ok for now",  is coming".   Pt was also offered [facility] Pad services.  Endorses SI in past, but not at present
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
## 2019-03-29 NOTE — ED BEHAVIORAL HEALTH ASSESSMENT NOTE - DESCRIPTION
Patient assessed for overwhelming anxiety. ED gave patient 0.25mg Xanax at 3:54am and Ativan at 06:08am with fluid bolus to address HR of 144bpm. HR has since decreased to 100bpm.  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 Weeping throughout interview, able to do deep breathing.  Ativan 1mg PO will be administered for anxiety.    

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
## 2019-03-29 NOTE — ED PROVIDER NOTE - CLINICAL SUMMARY MEDICAL DECISION MAKING FREE TEXT BOX
44 y/o F hx Bipolar, Hearing /Speech Impaired    Labs, Urine Tox/UA, EKG, HCG.  Medical evaluation performed. There is no clinical evidence of intoxication or any acute medical problem requiring immediate intervention. Patient is awaiting psychiatric consultation. Final disposition will be determined by psychiatrist. Recommend inpatient psychiatric admission .

## 2019-03-29 NOTE — ED BEHAVIORAL HEALTH ASSESSMENT NOTE - UNABLE TO CARE FOR SELF DETAILS
patient fears she will harm herself if she is not in controlled environment, has multiple suicide risk factors

## 2019-03-29 NOTE — ED BEHAVIORAL HEALTH ASSESSMENT NOTE - DIFFERENTIAL
Bipolar 1 Disorder, acute recurrent manic episode major depressive episode vs adjustment disorder with depressed mood vs borderline personality disorder vs Bipolar 1 Disorder

## 2019-03-29 NOTE — ED ADULT TRIAGE NOTE - CHIEF COMPLAINT QUOTE
Presents with c/o anxiety on and off since November, "really bad since January.  Presents for anxiety for 5 weeks and was treated for 3 weeks at [facility]. Depression since November.  also having panic attacks.  Pt hearing impaired, offered , but as per pt "I'm ok for now",  is coming".   Endorses SI in past, but not at present Presents with c/o anxiety on and off since November, "really bad since January.  Presents for anxiety for 5 weeks and was treated for 3 weeks at [facility]. Depression since November.  also having panic attacks.  Pt hearing impaired, offered , but as per pt "I'm ok for now",  is coming".   Pt was also offered [facility] Pad services.  Endorses SI in past, but not at present
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
## 2019-03-29 NOTE — ED BEHAVIORAL HEALTH ASSESSMENT NOTE - CURRENT MEDICATION
Lamictal 100mg QD, lithium 1200mg QD, and Cymbalta 80mg QD Home Medications:  KlonoPIN 1 mg oral tablet: 1 tab(s) orally 2 times a day (29 Mar 2019 19:07)  lamoTRIgine 100 mg oral tablet: 2 tab(s) orally once a day (29 Mar 2019 19:06)  SEROquel 300 mg oral tablet: 1 tab(s) orally once a day (at bedtime) (29 Mar 2019 19:07)  SEROquel 50 mg oral tablet: 1 tab(s) orally once a day (at bedtime) (29 Mar 2019 19:08)

## 2019-03-29 NOTE — ED PROVIDER NOTE - OBJECTIVE STATEMENT
44 y/o F hx Bipolar, Hearing /Speech Impaired  presents to ER  w c/o depression and suicidal  ideation.  Denies active suicidal plan.  Admits to multiple social stressors, insomnia, anxiousness and inability to cope.   No evidence of physical injuries, broken  skin or deformities.  Denies AH/VH/HI.   Denies pain,  dizziness, SOB, fever, chills, chest/ abdominal discomfort. Denies falling, punching or kicking any objects. Denies recent use of alcohol or illicit drugs. Admits to medication compliance.

## 2019-03-29 NOTE — ED BEHAVIORAL HEALTH ASSESSMENT NOTE - SUICIDE PROTECTIVE FACTORS
Identifies reasons for living/Future oriented/Fear of death or dying due to pain/suffering/Engaged in work or school/Positive therapeutic relationships/Responsibility to family and others/Supportive social network or family

## 2019-03-29 NOTE — ED BEHAVIORAL HEALTH ASSESSMENT NOTE - OTHER PAST PSYCHIATRIC HISTORY (INCLUDE DETAILS REGARDING ONSET, COURSE OF ILLNESS, INPATIENT/OUTPATIENT TREATMENT)
Bipolar D/O & BPD, no hx SA/SIB/HA, 2 past psychiatric hospitalizations (most recent 2016 [facility] 2W 08/01/2016-08/09/2016, post-partum manic episode), currently followed at outpatient [facility] bipolar clinic by Dr. Yari Gibbs (previously AOPD 2016- July 2018) Bipolar D/O & BPD, no hx SA/SIB/HA, 3 past psychiatric hospitalizations (most recent 2018 [facility] 2W and also 08/01/2016-08/09/2016, post-partum manic episode), currently followed at [facility] at [facility]
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
## 2019-03-30 RX ORDER — ACETAMINOPHEN 500 MG
650 TABLET ORAL ONCE
Qty: 0 | Refills: 0 | Status: COMPLETED | OUTPATIENT
Start: 2019-03-30 | End: 2019-03-30

## 2019-03-30 RX ADMIN — Medication 1 MILLIGRAM(S): at 08:49

## 2019-03-30 RX ADMIN — Medication 1 MILLIGRAM(S): at 20:15

## 2019-03-30 RX ADMIN — Medication 2 MILLIGRAM(S): at 00:31

## 2019-03-30 RX ADMIN — LAMOTRIGINE 200 MILLIGRAM(S): 25 TABLET, ORALLY DISINTEGRATING ORAL at 08:49

## 2019-03-30 RX ADMIN — Medication 1 MILLIGRAM(S): at 14:01

## 2019-03-30 RX ADMIN — Medication 650 MILLIGRAM(S): at 21:56

## 2019-03-30 RX ADMIN — LITHIUM CARBONATE 1200 MILLIGRAM(S): 300 TABLET, EXTENDED RELEASE ORAL at 20:15

## 2019-03-30 RX ADMIN — QUETIAPINE FUMARATE 350 MILLIGRAM(S): 200 TABLET, FILM COATED ORAL at 20:15

## 2019-03-30 NOTE — CHART NOTE - NSCHARTNOTEFT_GEN_A_CORE
Notified by RN, pt fell while using shower. Pt seen and examined at bedside. Pt reports that she fell while taking shower and denies LOC, HA, lightheadedness, dizzyness, visual disturbances (diplopia, loss of vision), left or right sided weakness, palpitations, SOB, or CP prior to falling. As per staff, another patient heard her fall, went to retrieve help Notified by RN, pt fell while using shower. Pt seen and examined at bedside- pt with baseline hearing impairment. Interview was conducted with her reading my lips and teach back method. Pt reports that she fell while taking shower and denies LOC, HA, lightheadedness, dizziness, visual disturbances (diplopia, loss of vision), left or right sided weakness, palpitations, SOB, or CP prior to falling. As per staff, another patient heard her fall, went to retrieve help and PCA found her already standing up touching back of head. Pt currently admits to head pain at site of impact. She continues to deny lightheadedness, dizziness, visual disturbances (diplopia, loss of vision), left or right sided weakness, palpitations, SOB, or CP.     VITAL SIGNS Last 24 Hrs  T(C): 36.7 (30 Mar 2019 20:49), Max: 36.8 (30 Mar 2019 08:34)  T(F): 98.1 (30 Mar 2019 20:49), Max: 98.2 (30 Mar 2019 08:34)  HR:  92 bpm  BP: 129/76 mmHg  RR: 18 (30 Mar 2019 20:49) (18 - 18)    PHYSICAL EXAM  Constitutional: NAD, well-developed, well-nourished, reading magazine upon arrival to unit.   HEENT: Normocephalic with exception of small raised silver coin sized area on right back parietal space with erythema. No lacerations or break in skin. EOMi's. PERRL. Conjunctiva non erythematous.   Pulmonary: CTAB, no wheezes, crackles, rhonchi  Cardiovascular: RRR, +s1 and +s2, no murmurs, gallops, rubs.   Neurologic: A&O x 3. Cranial Nerves II - XII intact. Motor and Sensory intact throughout b/l UE and LE. DTR's present and 2/2 throughout. Negative Rombergs.      MOTOR   C5    C6   C7    C8    L2     L3     L4    L5   S1       Right      5      5      5     5      5      5       5     5     5       Left        5      5       5    5      5       5       5     5    5    Musculoskeletal: Full active and passive ROM of bilateral UE and LE with no deficits. No disturbance in gait.   Skin: small raised silver coin sized area on right back parietal space with erythema- no lesions/lacerations. No other areas of ecchymosis, edema, or erythema noted.   Psych: A&O x3, cooperative, pleasant affect    A/P:  Pt is a 43y.o. female with PMHx significant for b/l Hearing sensory loss and hearing impairment (uses hearing aids) admitted to [facility] from [facility] ED for Major Depressive Disorder with Single episode who fell this evening while taking a shower with impact to right side of head. She is not on any anticoagulant therapy at this time.  She denies LOC, lightheadedness, dizziness, HA, SOB, CP, palpitations prior to falling and patient's only concern at this time is pain at site of impact. Neuro exam WNL with no deficits. Will continue to monitor.   - Tylenol 650mg x 1 given for head pain  - Cool compress to be applied to affected area PRN  - Educated patient on signs and symptoms to look for: worsening HA, neck pain, visual disturbances, weakness, lightheadedness, dizziness, altered mental status, nausea, and emesis.  - Will continue to monitor   - Neuro checks q4hrs throughout night    TITO Hickman PA-C  Pager# [phone] Notified by RN, pt fell while using shower. Pt seen and examined at bedside- pt with baseline hearing impairment. Interview was conducted with her reading my lips and teach back method. Pt reports that she fell while taking shower and denies LOC, HA, lightheadedness, dizziness, visual disturbances (diplopia, loss of vision), left or right sided weakness, palpitations, SOB, or CP prior to falling. As per staff, another patient heard her fall, went to retrieve help and PCA found her already standing up touching back of head. Pt currently admits to head pain at site of impact. She continues to deny lightheadedness, dizziness, visual disturbances (diplopia, loss of vision), left or right sided weakness, palpitations, SOB, or CP.     VITAL SIGNS Last 24 Hrs  T(C): 36.7 (30 Mar 2019 20:49), Max: 36.8 (30 Mar 2019 08:34)  T(F): 98.1 (30 Mar 2019 20:49), Max: 98.2 (30 Mar 2019 08:34)  HR:  92 bpm  BP: 129/76 mmHg  RR: 18 (30 Mar 2019 20:49) (18 - 18)    PHYSICAL EXAM  Constitutional: NAD, well-developed, well-nourished, reading magazine upon arrival to unit.   HEENT: Normocephalic with exception of small raised silver coin sized area on right back parietal space with erythema. No lacerations or break in skin. EOMi's. PERRL. Conjunctiva non erythematous.   Pulmonary: CTAB, no wheezes, crackles, rhonchi  Cardiovascular: RRR, +s1 and +s2, no murmurs, gallops, rubs.   Neurologic: A&O x 3. Cranial Nerves II - XII intact. Motor and Sensory intact throughout b/l UE and LE. DTR's present and 2/2 throughout. Negative Rombergs.      MOTOR   C5    C6    C7    C8     L2     L3      L4      L5    S1       Right     5/5   5/5   5/5   5/5   5/5    5/5     5/5    5/5   5/5       Left       5/5   5/5   5/5   5/5   5/5    5/5     5/5    5/5   5/5    Musculoskeletal: Full active and passive ROM of bilateral UE and LE with no deficits. No disturbance in gait.   Skin: small raised silver coin sized area on right back parietal space with erythema- no lesions/lacerations. No other areas of ecchymosis, edema, or erythema noted.   Psych: A&O x3, cooperative, pleasant affect    A/P:  Pt is a 43y.o. female with PMHx significant for b/l Hearing sensory loss and hearing impairment (uses hearing aids) admitted to [facility] from [facility] ED for Major Depressive Disorder with Single episode who fell this evening while taking a shower with impact to right side of head. She is not on any anticoagulant therapy at this time.  She denies LOC, lightheadedness, dizziness, HA, SOB, CP, palpitations prior to falling and patient's only concern at this time is pain at site of impact. Neuro exam WNL with no deficits. Will continue to monitor.   - Tylenol 650mg x 1 given for head pain  - Cool compress to be applied to affected area PRN  - Educated patient on signs and symptoms to look for: worsening HA, neck pain, visual disturbances, weakness, lightheadedness, dizziness, altered mental status, nausea, and emesis.  - Will continue to monitor   - Neuro checks q4hrs throughout night    TITO Hickman PA-C  Pager# [phone]

## 2019-03-30 NOTE — ED BEHAVIORAL HEALTH NOTE - BEHAVIORAL HEALTH NOTE
[facility] contacted for precert at [phone]. Writer spoke with Giselle and was provided auth #[id number] for 5 days (3/29-4/2). Review due on 4/2 with reviewer to be assigned during normal business hours. Concurrent review contact number is [phone].

## 2019-03-31 PROCEDURE — 99232 SBSQ HOSP IP/OBS MODERATE 35: CPT

## 2019-03-31 RX ORDER — IBUPROFEN 200 MG
600 TABLET ORAL ONCE
Qty: 0 | Refills: 0 | Status: COMPLETED | OUTPATIENT
Start: 2019-03-31 | End: 2019-03-31

## 2019-03-31 RX ORDER — ACETAMINOPHEN 500 MG
650 TABLET ORAL EVERY 6 HOURS
Qty: 0 | Refills: 0 | Status: DISCONTINUED | OUTPATIENT
Start: 2019-03-31 | End: 2019-04-10

## 2019-03-31 RX ORDER — SULINDAC 200 MG/1
200 TABLET ORAL EVERY 12 HOURS
Qty: 0 | Refills: 0 | Status: DISCONTINUED | OUTPATIENT
Start: 2019-03-31 | End: 2019-04-01

## 2019-03-31 RX ADMIN — SULINDAC 200 MILLIGRAM(S): 200 TABLET ORAL at 15:06

## 2019-03-31 RX ADMIN — Medication 1 MILLIGRAM(S): at 20:19

## 2019-03-31 RX ADMIN — Medication 1 MILLIGRAM(S): at 12:12

## 2019-03-31 RX ADMIN — Medication 600 MILLIGRAM(S): at 21:00

## 2019-03-31 RX ADMIN — Medication 600 MILLIGRAM(S): at 22:00

## 2019-03-31 RX ADMIN — LITHIUM CARBONATE 1200 MILLIGRAM(S): 300 TABLET, EXTENDED RELEASE ORAL at 20:19

## 2019-03-31 RX ADMIN — Medication 650 MILLIGRAM(S): at 11:30

## 2019-03-31 RX ADMIN — SULINDAC 200 MILLIGRAM(S): 200 TABLET ORAL at 13:29

## 2019-03-31 RX ADMIN — LAMOTRIGINE 200 MILLIGRAM(S): 25 TABLET, ORALLY DISINTEGRATING ORAL at 08:32

## 2019-03-31 RX ADMIN — Medication 1 MILLIGRAM(S): at 08:32

## 2019-03-31 RX ADMIN — QUETIAPINE FUMARATE 350 MILLIGRAM(S): 200 TABLET, FILM COATED ORAL at 20:19

## 2019-03-31 RX ADMIN — Medication 650 MILLIGRAM(S): at 10:46

## 2019-04-01 LAB
APPEARANCE UR: CLEAR — SIGNIFICANT CHANGE UP
BILIRUB UR-MCNC: NEGATIVE — SIGNIFICANT CHANGE UP
BLOOD UR QL VISUAL: NEGATIVE — SIGNIFICANT CHANGE UP
COLOR SPEC: SIGNIFICANT CHANGE UP
GLUCOSE UR-MCNC: NEGATIVE — SIGNIFICANT CHANGE UP
KETONES UR-MCNC: NEGATIVE — SIGNIFICANT CHANGE UP
LEUKOCYTE ESTERASE UR-ACNC: NEGATIVE — SIGNIFICANT CHANGE UP
NITRITE UR-MCNC: NEGATIVE — SIGNIFICANT CHANGE UP
PH UR: 5.5 — SIGNIFICANT CHANGE UP (ref 5–8)
PROT UR-MCNC: NEGATIVE — SIGNIFICANT CHANGE UP
SP GR SPEC: 1.02 — SIGNIFICANT CHANGE UP (ref 1–1.04)
UROBILINOGEN FLD QL: NORMAL — SIGNIFICANT CHANGE UP

## 2019-04-01 PROCEDURE — 99232 SBSQ HOSP IP/OBS MODERATE 35: CPT | Mod: GC

## 2019-04-01 RX ORDER — CLONAZEPAM 1 MG
1.5 TABLET ORAL AT BEDTIME
Qty: 0 | Refills: 0 | Status: DISCONTINUED | OUTPATIENT
Start: 2019-04-01 | End: 2019-04-08

## 2019-04-01 RX ORDER — LAMOTRIGINE 25 MG/1
200 TABLET, ORALLY DISINTEGRATING ORAL AT BEDTIME
Qty: 0 | Refills: 0 | Status: DISCONTINUED | OUTPATIENT
Start: 2019-04-02 | End: 2019-04-10

## 2019-04-01 RX ORDER — LITHIUM CARBONATE 300 MG/1
1200 TABLET, EXTENDED RELEASE ORAL AT BEDTIME
Qty: 0 | Refills: 0 | Status: DISCONTINUED | OUTPATIENT
Start: 2019-04-01 | End: 2019-04-10

## 2019-04-01 RX ORDER — CLONAZEPAM 1 MG
2 TABLET ORAL AT BEDTIME
Qty: 0 | Refills: 0 | Status: DISCONTINUED | OUTPATIENT
Start: 2019-04-01 | End: 2019-04-01

## 2019-04-01 RX ORDER — LAMOTRIGINE 25 MG/1
200 TABLET, ORALLY DISINTEGRATING ORAL AT BEDTIME
Qty: 0 | Refills: 0 | Status: DISCONTINUED | OUTPATIENT
Start: 2019-04-01 | End: 2019-04-01

## 2019-04-01 RX ORDER — QUETIAPINE FUMARATE 200 MG/1
400 TABLET, FILM COATED ORAL AT BEDTIME
Qty: 0 | Refills: 0 | Status: DISCONTINUED | OUTPATIENT
Start: 2019-04-01 | End: 2019-04-10

## 2019-04-01 RX ORDER — CLONAZEPAM 1 MG
0.5 TABLET ORAL DAILY
Qty: 0 | Refills: 0 | Status: DISCONTINUED | OUTPATIENT
Start: 2019-04-02 | End: 2019-04-09

## 2019-04-01 RX ADMIN — QUETIAPINE FUMARATE 400 MILLIGRAM(S): 200 TABLET, FILM COATED ORAL at 21:50

## 2019-04-01 RX ADMIN — Medication 1 MILLIGRAM(S): at 09:57

## 2019-04-01 RX ADMIN — LAMOTRIGINE 200 MILLIGRAM(S): 25 TABLET, ORALLY DISINTEGRATING ORAL at 09:57

## 2019-04-01 RX ADMIN — Medication 1.5 MILLIGRAM(S): at 21:49

## 2019-04-01 RX ADMIN — LITHIUM CARBONATE 1200 MILLIGRAM(S): 300 TABLET, EXTENDED RELEASE ORAL at 21:49

## 2019-04-02 LAB — LITHIUM SERPL-MCNC: 1.07 MMOL/L — SIGNIFICANT CHANGE UP (ref 0.6–1.2)

## 2019-04-02 PROCEDURE — 99232 SBSQ HOSP IP/OBS MODERATE 35: CPT | Mod: GC

## 2019-04-02 RX ORDER — BUPROPION HYDROCHLORIDE 150 MG/1
150 TABLET, EXTENDED RELEASE ORAL ONCE
Qty: 0 | Refills: 0 | Status: COMPLETED | OUTPATIENT
Start: 2019-04-02 | End: 2019-04-02

## 2019-04-02 RX ORDER — BUPROPION HYDROCHLORIDE 150 MG/1
150 TABLET, EXTENDED RELEASE ORAL DAILY
Qty: 0 | Refills: 0 | Status: DISCONTINUED | OUTPATIENT
Start: 2019-04-03 | End: 2019-04-05

## 2019-04-02 RX ADMIN — LITHIUM CARBONATE 1200 MILLIGRAM(S): 300 TABLET, EXTENDED RELEASE ORAL at 20:39

## 2019-04-02 RX ADMIN — Medication 1 MILLIGRAM(S): at 16:07

## 2019-04-02 RX ADMIN — Medication 1.5 MILLIGRAM(S): at 20:39

## 2019-04-02 RX ADMIN — Medication 0.5 MILLIGRAM(S): at 09:34

## 2019-04-02 RX ADMIN — QUETIAPINE FUMARATE 400 MILLIGRAM(S): 200 TABLET, FILM COATED ORAL at 20:39

## 2019-04-02 RX ADMIN — LAMOTRIGINE 200 MILLIGRAM(S): 25 TABLET, ORALLY DISINTEGRATING ORAL at 20:39

## 2019-04-02 RX ADMIN — BUPROPION HYDROCHLORIDE 150 MILLIGRAM(S): 150 TABLET, EXTENDED RELEASE ORAL at 11:57

## 2019-04-03 PROCEDURE — 99232 SBSQ HOSP IP/OBS MODERATE 35: CPT | Mod: GC

## 2019-04-03 RX ADMIN — BUPROPION HYDROCHLORIDE 150 MILLIGRAM(S): 150 TABLET, EXTENDED RELEASE ORAL at 09:39

## 2019-04-03 RX ADMIN — QUETIAPINE FUMARATE 400 MILLIGRAM(S): 200 TABLET, FILM COATED ORAL at 21:24

## 2019-04-03 RX ADMIN — Medication 1.5 MILLIGRAM(S): at 21:24

## 2019-04-03 RX ADMIN — LAMOTRIGINE 200 MILLIGRAM(S): 25 TABLET, ORALLY DISINTEGRATING ORAL at 21:24

## 2019-04-03 RX ADMIN — LITHIUM CARBONATE 1200 MILLIGRAM(S): 300 TABLET, EXTENDED RELEASE ORAL at 21:24

## 2019-04-03 RX ADMIN — Medication 0.5 MILLIGRAM(S): at 09:39

## 2019-04-04 PROCEDURE — 90853 GROUP PSYCHOTHERAPY: CPT

## 2019-04-04 PROCEDURE — 99232 SBSQ HOSP IP/OBS MODERATE 35: CPT | Mod: 25

## 2019-04-04 RX ADMIN — BUPROPION HYDROCHLORIDE 150 MILLIGRAM(S): 150 TABLET, EXTENDED RELEASE ORAL at 08:39

## 2019-04-04 RX ADMIN — LITHIUM CARBONATE 1200 MILLIGRAM(S): 300 TABLET, EXTENDED RELEASE ORAL at 21:32

## 2019-04-04 RX ADMIN — LAMOTRIGINE 200 MILLIGRAM(S): 25 TABLET, ORALLY DISINTEGRATING ORAL at 21:32

## 2019-04-04 RX ADMIN — QUETIAPINE FUMARATE 400 MILLIGRAM(S): 200 TABLET, FILM COATED ORAL at 21:32

## 2019-04-04 RX ADMIN — Medication 1.5 MILLIGRAM(S): at 21:32

## 2019-04-04 RX ADMIN — Medication 0.5 MILLIGRAM(S): at 08:39

## 2019-04-04 RX ADMIN — Medication 1 MILLIGRAM(S): at 23:00

## 2019-04-05 LAB
ALBUMIN SERPL ELPH-MCNC: 4.2 G/DL — SIGNIFICANT CHANGE UP (ref 3.3–5)
ALP SERPL-CCNC: 91 U/L — SIGNIFICANT CHANGE UP (ref 40–120)
ALT FLD-CCNC: 11 U/L — SIGNIFICANT CHANGE UP (ref 4–33)
ANION GAP SERPL CALC-SCNC: 10 MMO/L — SIGNIFICANT CHANGE UP (ref 7–14)
AST SERPL-CCNC: 14 U/L — SIGNIFICANT CHANGE UP (ref 4–32)
BILIRUB DIRECT SERPL-MCNC: < 0.2 MG/DL — SIGNIFICANT CHANGE UP (ref 0.1–0.2)
BILIRUB SERPL-MCNC: 0.3 MG/DL — SIGNIFICANT CHANGE UP (ref 0.2–1.2)
BUN SERPL-MCNC: 10 MG/DL — SIGNIFICANT CHANGE UP (ref 7–23)
CALCIUM SERPL-MCNC: 9.3 MG/DL — SIGNIFICANT CHANGE UP (ref 8.4–10.5)
CHLORIDE SERPL-SCNC: 106 MMOL/L — SIGNIFICANT CHANGE UP (ref 98–107)
CO2 SERPL-SCNC: 25 MMOL/L — SIGNIFICANT CHANGE UP (ref 22–31)
CREAT SERPL-MCNC: 1 MG/DL — SIGNIFICANT CHANGE UP (ref 0.5–1.3)
GLUCOSE SERPL-MCNC: 97 MG/DL — SIGNIFICANT CHANGE UP (ref 70–99)
HCT VFR BLD CALC: 39 % — SIGNIFICANT CHANGE UP (ref 34.5–45)
HGB BLD-MCNC: 12 G/DL — SIGNIFICANT CHANGE UP (ref 11.5–15.5)
LITHIUM SERPL-MCNC: 0.81 MMOL/L — SIGNIFICANT CHANGE UP (ref 0.6–1.2)
MCHC RBC-ENTMCNC: 28.4 PG — SIGNIFICANT CHANGE UP (ref 27–34)
MCHC RBC-ENTMCNC: 30.8 % — LOW (ref 32–36)
MCV RBC AUTO: 92.4 FL — SIGNIFICANT CHANGE UP (ref 80–100)
NRBC # FLD: 0 K/UL — SIGNIFICANT CHANGE UP (ref 0–0)
PLATELET # BLD AUTO: 383 K/UL — SIGNIFICANT CHANGE UP (ref 150–400)
PMV BLD: 9.4 FL — SIGNIFICANT CHANGE UP (ref 7–13)
POTASSIUM SERPL-MCNC: 4.8 MMOL/L — SIGNIFICANT CHANGE UP (ref 3.5–5.3)
POTASSIUM SERPL-SCNC: 4.8 MMOL/L — SIGNIFICANT CHANGE UP (ref 3.5–5.3)
PROT SERPL-MCNC: 6.8 G/DL — SIGNIFICANT CHANGE UP (ref 6–8.3)
RBC # BLD: 4.22 M/UL — SIGNIFICANT CHANGE UP (ref 3.8–5.2)
RBC # FLD: 13.6 % — SIGNIFICANT CHANGE UP (ref 10.3–14.5)
SODIUM SERPL-SCNC: 141 MMOL/L — SIGNIFICANT CHANGE UP (ref 135–145)
WBC # BLD: 12.96 K/UL — HIGH (ref 3.8–10.5)
WBC # FLD AUTO: 12.96 K/UL — HIGH (ref 3.8–10.5)

## 2019-04-05 PROCEDURE — 99232 SBSQ HOSP IP/OBS MODERATE 35: CPT

## 2019-04-05 RX ORDER — CLONAZEPAM 1 MG
1.5 TABLET ORAL AT BEDTIME
Qty: 0 | Refills: 0 | Status: DISCONTINUED | OUTPATIENT
Start: 2019-04-09 | End: 2019-04-10

## 2019-04-05 RX ORDER — CLONAZEPAM 1 MG
0.5 TABLET ORAL DAILY
Qty: 0 | Refills: 0 | Status: DISCONTINUED | OUTPATIENT
Start: 2019-04-10 | End: 2019-04-10

## 2019-04-05 RX ORDER — PRAZOSIN HCL 2 MG
1 CAPSULE ORAL AT BEDTIME
Qty: 0 | Refills: 0 | Status: DISCONTINUED | OUTPATIENT
Start: 2019-04-05 | End: 2019-04-08

## 2019-04-05 RX ADMIN — Medication 1 MILLIGRAM(S): at 19:00

## 2019-04-05 RX ADMIN — Medication 2 MILLIGRAM(S): at 01:26

## 2019-04-05 RX ADMIN — LAMOTRIGINE 200 MILLIGRAM(S): 25 TABLET, ORALLY DISINTEGRATING ORAL at 20:47

## 2019-04-05 RX ADMIN — QUETIAPINE FUMARATE 400 MILLIGRAM(S): 200 TABLET, FILM COATED ORAL at 20:47

## 2019-04-05 RX ADMIN — Medication 0.5 MILLIGRAM(S): at 12:58

## 2019-04-05 RX ADMIN — Medication 1 MILLIGRAM(S): at 20:47

## 2019-04-05 RX ADMIN — Medication 1.5 MILLIGRAM(S): at 20:47

## 2019-04-05 RX ADMIN — LITHIUM CARBONATE 1200 MILLIGRAM(S): 300 TABLET, EXTENDED RELEASE ORAL at 20:47

## 2019-04-05 NOTE — PHARMACOTHERAPY INTERVENTION NOTE - COMMENTS
Patient's labs from 3/12/19  and 3/29/19 were indicating elevated WBC (12.27 and 16.29 respectively). Discussed with  this morning to evaluate and rule out infectious process. Urinalysis from 4/1/19 was clean and no indication of UTI. Suggested to get an ID consult or hospitalist to take a look for possible infection. As per , new CBS was ordered to check if WBC is still high and discuss with the team.
Labs from 4/5/19 still showing elevated WBC [12.96]. Recommendation to obtain an ID consult to evaluate and treat possible infectious process. Discussed with  on 4/5/19.

## 2019-04-06 RX ADMIN — Medication 0.5 MILLIGRAM(S): at 09:33

## 2019-04-06 RX ADMIN — Medication 1 MILLIGRAM(S): at 12:57

## 2019-04-06 RX ADMIN — Medication 1 MILLIGRAM(S): at 22:03

## 2019-04-06 RX ADMIN — Medication 1.5 MILLIGRAM(S): at 22:03

## 2019-04-06 RX ADMIN — LAMOTRIGINE 200 MILLIGRAM(S): 25 TABLET, ORALLY DISINTEGRATING ORAL at 22:03

## 2019-04-06 RX ADMIN — Medication 1 MILLIGRAM(S): at 22:56

## 2019-04-06 RX ADMIN — LITHIUM CARBONATE 1200 MILLIGRAM(S): 300 TABLET, EXTENDED RELEASE ORAL at 22:03

## 2019-04-06 RX ADMIN — Medication 1 MILLIGRAM(S): at 01:01

## 2019-04-06 RX ADMIN — QUETIAPINE FUMARATE 400 MILLIGRAM(S): 200 TABLET, FILM COATED ORAL at 22:03

## 2019-04-07 RX ORDER — HYDROXYZINE HCL 10 MG
25 TABLET ORAL ONCE
Qty: 0 | Refills: 0 | Status: COMPLETED | OUTPATIENT
Start: 2019-04-07 | End: 2019-04-07

## 2019-04-07 RX ADMIN — LITHIUM CARBONATE 1200 MILLIGRAM(S): 300 TABLET, EXTENDED RELEASE ORAL at 20:21

## 2019-04-07 RX ADMIN — Medication 1 MILLIGRAM(S): at 16:46

## 2019-04-07 RX ADMIN — Medication 1.5 MILLIGRAM(S): at 20:21

## 2019-04-07 RX ADMIN — QUETIAPINE FUMARATE 400 MILLIGRAM(S): 200 TABLET, FILM COATED ORAL at 20:21

## 2019-04-07 RX ADMIN — Medication 0.5 MILLIGRAM(S): at 08:57

## 2019-04-07 RX ADMIN — LAMOTRIGINE 200 MILLIGRAM(S): 25 TABLET, ORALLY DISINTEGRATING ORAL at 20:21

## 2019-04-07 RX ADMIN — Medication 1 MILLIGRAM(S): at 20:21

## 2019-04-07 RX ADMIN — Medication 25 MILLIGRAM(S): at 01:21

## 2019-04-08 PROCEDURE — 99232 SBSQ HOSP IP/OBS MODERATE 35: CPT

## 2019-04-08 RX ORDER — PRAZOSIN HCL 2 MG
2 CAPSULE ORAL AT BEDTIME
Qty: 0 | Refills: 0 | Status: DISCONTINUED | OUTPATIENT
Start: 2019-04-08 | End: 2019-04-10

## 2019-04-08 RX ADMIN — Medication 1.5 MILLIGRAM(S): at 21:09

## 2019-04-08 RX ADMIN — LITHIUM CARBONATE 1200 MILLIGRAM(S): 300 TABLET, EXTENDED RELEASE ORAL at 21:09

## 2019-04-08 RX ADMIN — Medication 2 MILLIGRAM(S): at 21:09

## 2019-04-08 RX ADMIN — QUETIAPINE FUMARATE 400 MILLIGRAM(S): 200 TABLET, FILM COATED ORAL at 21:09

## 2019-04-08 RX ADMIN — Medication 1 MILLIGRAM(S): at 01:51

## 2019-04-08 RX ADMIN — LAMOTRIGINE 200 MILLIGRAM(S): 25 TABLET, ORALLY DISINTEGRATING ORAL at 21:09

## 2019-04-08 RX ADMIN — Medication 1 MILLIGRAM(S): at 23:13

## 2019-04-08 RX ADMIN — Medication 0.5 MILLIGRAM(S): at 09:16

## 2019-04-08 RX ADMIN — Medication 1 MILLIGRAM(S): at 17:06

## 2019-04-09 PROCEDURE — 90837 PSYTX W PT 60 MINUTES: CPT

## 2019-04-09 PROCEDURE — 99231 SBSQ HOSP IP/OBS SF/LOW 25: CPT

## 2019-04-09 RX ORDER — CLONAZEPAM 1 MG
3 TABLET ORAL
Qty: 28 | Refills: 0 | OUTPATIENT
Start: 2019-04-09 | End: 2019-04-15

## 2019-04-09 RX ORDER — DIPHENHYDRAMINE HCL 50 MG
50 CAPSULE ORAL ONCE
Qty: 0 | Refills: 0 | Status: COMPLETED | OUTPATIENT
Start: 2019-04-09 | End: 2019-04-09

## 2019-04-09 RX ORDER — LAMOTRIGINE 25 MG/1
1 TABLET, ORALLY DISINTEGRATING ORAL
Qty: 7 | Refills: 0 | OUTPATIENT
Start: 2019-04-09 | End: 2019-04-15

## 2019-04-09 RX ORDER — LITHIUM CARBONATE 300 MG/1
4 TABLET, EXTENDED RELEASE ORAL
Qty: 28 | Refills: 0 | OUTPATIENT
Start: 2019-04-09 | End: 2019-04-15

## 2019-04-09 RX ORDER — QUETIAPINE FUMARATE 200 MG/1
1 TABLET, FILM COATED ORAL
Qty: 0 | Refills: 0 | COMMUNITY

## 2019-04-09 RX ORDER — QUETIAPINE FUMARATE 200 MG/1
1 TABLET, FILM COATED ORAL
Qty: 7 | Refills: 0 | OUTPATIENT
Start: 2019-04-09 | End: 2019-04-15

## 2019-04-09 RX ORDER — CLONAZEPAM 1 MG
1 TABLET ORAL
Qty: 0 | Refills: 0 | COMMUNITY

## 2019-04-09 RX ORDER — HYDROXYZINE HCL 10 MG
50 TABLET ORAL ONCE
Qty: 0 | Refills: 0 | Status: COMPLETED | OUTPATIENT
Start: 2019-04-09 | End: 2019-04-09

## 2019-04-09 RX ORDER — PRAZOSIN HCL 2 MG
1 CAPSULE ORAL
Qty: 7 | Refills: 0 | OUTPATIENT
Start: 2019-04-09 | End: 2019-04-15

## 2019-04-09 RX ADMIN — Medication 50 MILLIGRAM(S): at 15:49

## 2019-04-09 RX ADMIN — Medication 2 MILLIGRAM(S): at 20:58

## 2019-04-09 RX ADMIN — QUETIAPINE FUMARATE 400 MILLIGRAM(S): 200 TABLET, FILM COATED ORAL at 20:58

## 2019-04-09 RX ADMIN — Medication 50 MILLIGRAM(S): at 23:19

## 2019-04-09 RX ADMIN — Medication 50 MILLIGRAM(S): at 03:59

## 2019-04-09 RX ADMIN — Medication 1.5 MILLIGRAM(S): at 20:58

## 2019-04-09 RX ADMIN — Medication 2 MILLIGRAM(S): at 00:25

## 2019-04-09 RX ADMIN — Medication 0.5 MILLIGRAM(S): at 09:32

## 2019-04-09 RX ADMIN — LITHIUM CARBONATE 1200 MILLIGRAM(S): 300 TABLET, EXTENDED RELEASE ORAL at 20:58

## 2019-04-09 RX ADMIN — LAMOTRIGINE 200 MILLIGRAM(S): 25 TABLET, ORALLY DISINTEGRATING ORAL at 20:58

## 2019-04-10 VITALS — TEMPERATURE: 98 F

## 2019-04-10 PROCEDURE — 99239 HOSP IP/OBS DSCHRG MGMT >30: CPT

## 2019-04-10 PROCEDURE — 90853 GROUP PSYCHOTHERAPY: CPT

## 2019-04-10 RX ADMIN — Medication 0.5 MILLIGRAM(S): at 11:39

## 2019-04-10 RX ADMIN — Medication 1 MILLIGRAM(S): at 00:58

## 2019-04-11 ENCOUNTER — OUTPATIENT (OUTPATIENT)
Dept: OUTPATIENT SERVICES | Facility: HOSPITAL | Age: 44
LOS: 1 days | Discharge: HOME | End: 2019-04-11
Payer: COMMERCIAL

## 2019-04-11 DIAGNOSIS — Z98.89 OTHER SPECIFIED POSTPROCEDURAL STATES: Chronic | ICD-10-CM

## 2019-05-03 DIAGNOSIS — F31.30 BIPOLAR DISORDER, CURRENT EPISODE DEPRESSED, MILD OR MODERATE SEVERITY, UNSPECIFIED: ICD-10-CM

## 2019-05-10 ENCOUNTER — OUTPATIENT (OUTPATIENT)
Dept: OUTPATIENT SERVICES | Facility: HOSPITAL | Age: 44
LOS: 1 days | Discharge: HOME | End: 2019-05-10
Payer: COMMERCIAL

## 2019-05-10 DIAGNOSIS — Z98.89 OTHER SPECIFIED POSTPROCEDURAL STATES: Chronic | ICD-10-CM

## 2019-05-10 NOTE — ED BEHAVIORAL HEALTH ASSESSMENT NOTE - NS ED BHA MSE SPEECH ARTICULATION
Received orders from Dio Lockhart of Urology to consult Hospitalist for protruding colostomy stoma. Perfect Serve message sent to [employer]	[place], PA of Hospitalist. Awaiting response. Other

## 2019-05-30 DIAGNOSIS — F31.30 BIPOLAR DISORDER, CURRENT EPISODE DEPRESSED, MILD OR MODERATE SEVERITY, UNSPECIFIED: ICD-10-CM

## 2019-05-30 DIAGNOSIS — F39 UNSPECIFIED MOOD [AFFECTIVE] DISORDER: ICD-10-CM

## 2019-08-15 LAB
ALBUMIN SERPL ELPH-MCNC: 4.2 G/DL — SIGNIFICANT CHANGE UP (ref 3.3–5)
ALP SERPL-CCNC: 84 U/L — SIGNIFICANT CHANGE UP (ref 40–120)
ALT FLD-CCNC: 10 U/L — SIGNIFICANT CHANGE UP (ref 4–33)
ANION GAP SERPL CALC-SCNC: 11 MMO/L — SIGNIFICANT CHANGE UP (ref 7–14)
AST SERPL-CCNC: 14 U/L — SIGNIFICANT CHANGE UP (ref 4–32)
BILIRUB SERPL-MCNC: 0.4 MG/DL — SIGNIFICANT CHANGE UP (ref 0.2–1.2)
BUN SERPL-MCNC: 13 MG/DL — SIGNIFICANT CHANGE UP (ref 7–23)
CALCIUM SERPL-MCNC: 9.1 MG/DL — SIGNIFICANT CHANGE UP (ref 8.4–10.5)
CHLORIDE SERPL-SCNC: 104 MMOL/L — SIGNIFICANT CHANGE UP (ref 98–107)
CO2 SERPL-SCNC: 26 MMOL/L — SIGNIFICANT CHANGE UP (ref 22–31)
CREAT SERPL-MCNC: 0.94 MG/DL — SIGNIFICANT CHANGE UP (ref 0.5–1.3)
GLUCOSE SERPL-MCNC: 84 MG/DL — SIGNIFICANT CHANGE UP (ref 70–99)
LITHIUM SERPL-MCNC: 0.98 MMOL/L — SIGNIFICANT CHANGE UP (ref 0.6–1.2)
POTASSIUM SERPL-MCNC: 3.9 MMOL/L — SIGNIFICANT CHANGE UP (ref 3.5–5.3)
POTASSIUM SERPL-SCNC: 3.9 MMOL/L — SIGNIFICANT CHANGE UP (ref 3.5–5.3)
PROT SERPL-MCNC: 6.8 G/DL — SIGNIFICANT CHANGE UP (ref 6–8.3)
SODIUM SERPL-SCNC: 141 MMOL/L — SIGNIFICANT CHANGE UP (ref 135–145)
T4 FREE SERPL-MCNC: 0.83 NG/DL — LOW (ref 0.9–1.8)
TSH SERPL-MCNC: 8.23 UIU/ML — HIGH (ref 0.27–4.2)

## 2020-07-29 ENCOUNTER — TRANSCRIPTION ENCOUNTER (OUTPATIENT)
Age: 45
End: 2020-07-29

## 2021-01-27 ENCOUNTER — APPOINTMENT (OUTPATIENT)
Dept: ULTRASOUND IMAGING | Facility: IMAGING CENTER | Age: 46
End: 2021-01-27
Payer: COMMERCIAL

## 2021-01-27 ENCOUNTER — APPOINTMENT (OUTPATIENT)
Dept: MAMMOGRAPHY | Facility: IMAGING CENTER | Age: 46
End: 2021-01-27
Payer: COMMERCIAL

## 2021-01-27 ENCOUNTER — OUTPATIENT (OUTPATIENT)
Dept: OUTPATIENT SERVICES | Facility: HOSPITAL | Age: 46
LOS: 1 days | End: 2021-01-27
Payer: COMMERCIAL

## 2021-01-27 DIAGNOSIS — Z98.89 OTHER SPECIFIED POSTPROCEDURAL STATES: Chronic | ICD-10-CM

## 2021-01-27 DIAGNOSIS — Z00.8 ENCOUNTER FOR OTHER GENERAL EXAMINATION: ICD-10-CM

## 2021-01-27 PROCEDURE — 77066 DX MAMMO INCL CAD BI: CPT | Mod: 26

## 2021-01-27 PROCEDURE — G0279: CPT | Mod: 26

## 2021-01-27 PROCEDURE — 77066 DX MAMMO INCL CAD BI: CPT

## 2021-01-27 PROCEDURE — 76641 ULTRASOUND BREAST COMPLETE: CPT | Mod: 26,50

## 2021-01-27 PROCEDURE — 76641 ULTRASOUND BREAST COMPLETE: CPT

## 2021-01-27 PROCEDURE — G0279: CPT

## 2021-05-20 PROCEDURE — 99214 OFFICE O/P EST MOD 30 MIN: CPT | Mod: 95

## 2021-08-07 ENCOUNTER — EMERGENCY (EMERGENCY)
Facility: HOSPITAL | Age: 46
LOS: 1 days | Discharge: HOME | End: 2021-08-07
Attending: STUDENT IN AN ORGANIZED HEALTH CARE EDUCATION/TRAINING PROGRAM | Admitting: STUDENT IN AN ORGANIZED HEALTH CARE EDUCATION/TRAINING PROGRAM
Payer: COMMERCIAL

## 2021-08-07 VITALS
HEIGHT: 62 IN | OXYGEN SATURATION: 100 % | SYSTOLIC BLOOD PRESSURE: 120 MMHG | TEMPERATURE: 98 F | RESPIRATION RATE: 17 BRPM | HEART RATE: 95 BPM | DIASTOLIC BLOOD PRESSURE: 69 MMHG

## 2021-08-07 DIAGNOSIS — Z98.89 OTHER SPECIFIED POSTPROCEDURAL STATES: Chronic | ICD-10-CM

## 2021-08-07 LAB
ALBUMIN SERPL ELPH-MCNC: 4.2 G/DL — SIGNIFICANT CHANGE UP (ref 3.3–5)
ALP SERPL-CCNC: 118 U/L — SIGNIFICANT CHANGE UP (ref 40–120)
ALT FLD-CCNC: 19 U/L — SIGNIFICANT CHANGE UP (ref 4–33)
ANION GAP SERPL CALC-SCNC: 14 MMOL/L — SIGNIFICANT CHANGE UP (ref 7–14)
APPEARANCE UR: CLEAR — SIGNIFICANT CHANGE UP
AST SERPL-CCNC: 15 U/L — SIGNIFICANT CHANGE UP (ref 4–32)
BACTERIA # UR AUTO: ABNORMAL
BASOPHILS # BLD AUTO: 0.05 K/UL — SIGNIFICANT CHANGE UP (ref 0–0.2)
BASOPHILS NFR BLD AUTO: 0.4 % — SIGNIFICANT CHANGE UP (ref 0–2)
BILIRUB SERPL-MCNC: 0.4 MG/DL — SIGNIFICANT CHANGE UP (ref 0.2–1.2)
BILIRUB UR-MCNC: NEGATIVE — SIGNIFICANT CHANGE UP
BLOOD GAS VENOUS COMPREHENSIVE RESULT: SIGNIFICANT CHANGE UP
BUN SERPL-MCNC: 12 MG/DL — SIGNIFICANT CHANGE UP (ref 7–23)
CALCIUM SERPL-MCNC: 9.5 MG/DL — SIGNIFICANT CHANGE UP (ref 8.4–10.5)
CHLORIDE SERPL-SCNC: 98 MMOL/L — SIGNIFICANT CHANGE UP (ref 98–107)
CO2 SERPL-SCNC: 23 MMOL/L — SIGNIFICANT CHANGE UP (ref 22–31)
COLOR SPEC: YELLOW — SIGNIFICANT CHANGE UP
CREAT SERPL-MCNC: 1.03 MG/DL — SIGNIFICANT CHANGE UP (ref 0.5–1.3)
DIFF PNL FLD: ABNORMAL
EOSINOPHIL # BLD AUTO: 0.25 K/UL — SIGNIFICANT CHANGE UP (ref 0–0.5)
EOSINOPHIL NFR BLD AUTO: 1.9 % — SIGNIFICANT CHANGE UP (ref 0–6)
EPI CELLS # UR: 3 /HPF — SIGNIFICANT CHANGE UP (ref 0–5)
GLUCOSE SERPL-MCNC: 126 MG/DL — HIGH (ref 70–99)
GLUCOSE UR QL: NEGATIVE — SIGNIFICANT CHANGE UP
HCG SERPL-ACNC: <5 MIU/ML — SIGNIFICANT CHANGE UP
HCT VFR BLD CALC: 39.4 % — SIGNIFICANT CHANGE UP (ref 34.5–45)
HGB BLD-MCNC: 12.6 G/DL — SIGNIFICANT CHANGE UP (ref 11.5–15.5)
HYALINE CASTS # UR AUTO: 0 /LPF — SIGNIFICANT CHANGE UP (ref 0–7)
IANC: 9.3 K/UL — HIGH (ref 1.5–8.5)
IMM GRANULOCYTES NFR BLD AUTO: 0.3 % — SIGNIFICANT CHANGE UP (ref 0–1.5)
KETONES UR-MCNC: NEGATIVE — SIGNIFICANT CHANGE UP
LEUKOCYTE ESTERASE UR-ACNC: NEGATIVE — SIGNIFICANT CHANGE UP
LIDOCAIN IGE QN: 19 U/L — SIGNIFICANT CHANGE UP (ref 7–60)
LYMPHOCYTES # BLD AUTO: 19.1 % — SIGNIFICANT CHANGE UP (ref 13–44)
LYMPHOCYTES # BLD AUTO: 2.49 K/UL — SIGNIFICANT CHANGE UP (ref 1–3.3)
MCHC RBC-ENTMCNC: 28.5 PG — SIGNIFICANT CHANGE UP (ref 27–34)
MCHC RBC-ENTMCNC: 32 GM/DL — SIGNIFICANT CHANGE UP (ref 32–36)
MCV RBC AUTO: 89.1 FL — SIGNIFICANT CHANGE UP (ref 80–100)
MONOCYTES # BLD AUTO: 0.88 K/UL — SIGNIFICANT CHANGE UP (ref 0–0.9)
MONOCYTES NFR BLD AUTO: 6.8 % — SIGNIFICANT CHANGE UP (ref 2–14)
NEUTROPHILS # BLD AUTO: 9.3 K/UL — HIGH (ref 1.8–7.4)
NEUTROPHILS NFR BLD AUTO: 71.5 % — SIGNIFICANT CHANGE UP (ref 43–77)
NITRITE UR-MCNC: NEGATIVE — SIGNIFICANT CHANGE UP
NRBC # BLD: 0 /100 WBCS — SIGNIFICANT CHANGE UP
NRBC # FLD: 0 K/UL — SIGNIFICANT CHANGE UP
PH UR: 6 — SIGNIFICANT CHANGE UP (ref 5–8)
PLATELET # BLD AUTO: 349 K/UL — SIGNIFICANT CHANGE UP (ref 150–400)
POTASSIUM SERPL-MCNC: 3.8 MMOL/L — SIGNIFICANT CHANGE UP (ref 3.5–5.3)
POTASSIUM SERPL-SCNC: 3.8 MMOL/L — SIGNIFICANT CHANGE UP (ref 3.5–5.3)
PROT SERPL-MCNC: 7.5 G/DL — SIGNIFICANT CHANGE UP (ref 6–8.3)
PROT UR-MCNC: ABNORMAL
RBC # BLD: 4.42 M/UL — SIGNIFICANT CHANGE UP (ref 3.8–5.2)
RBC # FLD: 13.2 % — SIGNIFICANT CHANGE UP (ref 10.3–14.5)
RBC CASTS # UR COMP ASSIST: 5 /HPF — HIGH (ref 0–4)
SODIUM SERPL-SCNC: 135 MMOL/L — SIGNIFICANT CHANGE UP (ref 135–145)
SP GR SPEC: 1.02 — SIGNIFICANT CHANGE UP (ref 1.01–1.02)
UROBILINOGEN FLD QL: SIGNIFICANT CHANGE UP
WBC # BLD: 13.01 K/UL — HIGH (ref 3.8–10.5)
WBC # FLD AUTO: 13.01 K/UL — HIGH (ref 3.8–10.5)
WBC UR QL: 5 /HPF — SIGNIFICANT CHANGE UP (ref 0–5)

## 2021-08-07 PROCEDURE — 99285 EMERGENCY DEPT VISIT HI MDM: CPT

## 2021-08-07 PROCEDURE — 74177 CT ABD & PELVIS W/CONTRAST: CPT | Mod: 26

## 2021-08-07 PROCEDURE — 76705 ECHO EXAM OF ABDOMEN: CPT | Mod: 26

## 2021-08-07 RX ORDER — SODIUM CHLORIDE 9 MG/ML
1000 INJECTION, SOLUTION INTRAVENOUS ONCE
Refills: 0 | Status: COMPLETED | OUTPATIENT
Start: 2021-08-07 | End: 2021-08-07

## 2021-08-07 RX ORDER — ACETAMINOPHEN 500 MG
650 TABLET ORAL ONCE
Refills: 0 | Status: COMPLETED | OUTPATIENT
Start: 2021-08-07 | End: 2021-08-07

## 2021-08-07 RX ORDER — FAMOTIDINE 10 MG/ML
20 INJECTION INTRAVENOUS ONCE
Refills: 0 | Status: COMPLETED | OUTPATIENT
Start: 2021-08-07 | End: 2021-08-07

## 2021-08-07 RX ORDER — MORPHINE SULFATE 50 MG/1
4 CAPSULE, EXTENDED RELEASE ORAL ONCE
Refills: 0 | Status: DISCONTINUED | OUTPATIENT
Start: 2021-08-07 | End: 2021-08-07

## 2021-08-07 RX ADMIN — MORPHINE SULFATE 4 MILLIGRAM(S): 50 CAPSULE, EXTENDED RELEASE ORAL at 21:06

## 2021-08-07 RX ADMIN — SODIUM CHLORIDE 2000 MILLILITER(S): 9 INJECTION, SOLUTION INTRAVENOUS at 19:14

## 2021-08-07 RX ADMIN — MORPHINE SULFATE 4 MILLIGRAM(S): 50 CAPSULE, EXTENDED RELEASE ORAL at 19:14

## 2021-08-07 RX ADMIN — MORPHINE SULFATE 4 MILLIGRAM(S): 50 CAPSULE, EXTENDED RELEASE ORAL at 20:47

## 2021-08-07 RX ADMIN — MORPHINE SULFATE 4 MILLIGRAM(S): 50 CAPSULE, EXTENDED RELEASE ORAL at 21:36

## 2021-08-07 NOTE — ED PROVIDER NOTE - ATTENDING CONTRIBUTION TO CARE
Larry Viveros DO:  patient seen and evaluated with the resident.  I was present for key portions of the History & Physical, and I agree with the Impression & Plan. 46 yo f pmh bipolar, deaf, pw abd pain. pt provided written hx. offered  ipad for sign language, declined. attempting to obtain in person sign language professional. reports several days of abd pain. now reduced BM and flatus. noted low grade elevated temp. arrives w/ diffuse abd pain, worse r sided upper/lower quadrants. was eval at urgent care and had concern for acute edgar. no hx abd surgeries. denies urinary sx. reports currently on menses. labs, imaging, reassess.

## 2021-08-07 NOTE — ED PROVIDER NOTE - NS ED ROS FT
CONSTITUTIONAL: + dec appetite. No fevers, chills, fatigue, unexpected weight change  ENT: No ear pain, nasal congestion, runny nose, sore throat  CV: No chest pain  PULM: No cough, shortness of breath  GI: + abdominal pain, constipation. No nausea, vomiting, diarrhea, bloody stools  : No dysuria, polyuria, hematuria  SKIN: + ankle rash. No swelling  MSK: No back pain, flank pain  NEURO: No headache  PSYCH: No SI/HI, hallucinations

## 2021-08-07 NOTE — ED ADULT NURSE NOTE - OBJECTIVE STATEMENT
Pt presents to room 11, alert&orientedx4, ambulatory, PMHX hearing impairment, requests in-person sign language interpretation, Justyna  at bedside coming to ED for evaluation of mid-epigastric pain x5days, radiating to b/l flank pain, to upper back. Upon arrival, pt appears uncomfortable, denies any n/v/d, chest pain or SOB. 20g IV established on left ac, labs drawn and sent, medication given as ordered. Awaiting US and CT. Will continue to monitor

## 2021-08-07 NOTE — ED PROVIDER NOTE - PHYSICAL EXAMINATION
GENERAL: middle aged female, lying in bed, NAD. Vital signs are within normal limits  EYES: Conjunctiva noninjected or pale, sclera anicteric  HENT: NC/AT, moist mucous membranes  NECK: Supple, trachea midline  LUNG: Nonlabored respirations, no wheezes, rales  CV: RRR, Pulses- Radial/dorsalis pedis: 2+ bilateral and equal  ABDOMEN: Nondistended, + RLQ tenderness with guarding on deep palpation, - dillard, no rovsings.  MSK: No visible deformities, nontender extremities, no CVA ttp  SKIN: No rashes, bruises  NEURO: AAOx4 (to person, place, time, event), no tremor, steady gait  PSYCH: Normal mood and affect

## 2021-08-07 NOTE — ED PROVIDER NOTE - PATIENT PORTAL LINK FT
You can access the FollowMyHealth Patient Portal offered by [facility] by registering at the following website: [email or web address]. By joining [employer]’s FollowMyHealth portal, you will also be able to view your health information using other applications (apps) compatible with our system.

## 2021-08-07 NOTE — ED ADULT TRIAGE NOTE - CHIEF COMPLAINT QUOTE
pt is deaf, will need an .  pt sent from [facility] for further eval. pt c/o epigastric pain radiating to the back, constipation, intermittent fever, feet pain and headache.

## 2021-08-07 NOTE — ED ADULT NURSE REASSESSMENT NOTE - NS ED NURSE REASSESS COMMENT FT1
report received from RANJAN Vu pt A&Ox4 appears comfortable and in no distress at this time. pt deaf,  Justyna at bedside. introduced self to patient. pt awaiting US and CT.
pt returned from US, c/o abdominal pain 7 or 8/10. MD Loi Carcamo aware. awaiting results and dispo.

## 2021-08-07 NOTE — ED PROVIDER NOTE - NSFOLLOWUPINSTRUCTIONS_ED_ALL_ED_FT
You were seen in the ED for abdominal pain.   The following labs/imaging were obtained: see attached (if applicable)  Continue home medications (if any).   Take omeprazole 20mg for 2 weeks, 1 hr prior to your first meal.  Take doxycycline as prescribed.   Take Miralax for constipation.   Return to the ED if you develop fever, inability to tolerate oral intake,  worsening or new concerning symptoms.  Follow up with your primary care in 2-3 days.   Discussed with pt results of work up, strict return precautions, and need for follow up.  Pt expressed understanding and agrees with plan.

## 2021-08-07 NOTE — ED ADULT NURSE NOTE - CHIEF COMPLAINT QUOTE
pt is deaf, will need an .  pt sent from [facility] for further eval. pt c/o epigastric pain radiating to the back, constipation, intermittent fever, feet pain and headache.

## 2021-08-07 NOTE — ED PROVIDER NOTE - OBJECTIVE STATEMENT
45F presents to the female for 9 days of gradual onset progressively worsening crampy epigastric/periumiblical abdominal pain, w/o nausea, vomiting but since last 4 days with constipation, not passing gas. Less PO. Denies urinary sxs, fever, chills. Walked in flip flops before onset of pain on a trail, noticed slight rash to ankles at the time. Went to [facility] who rx protonix. Sxs not improved .Tried tylenol and ibuprofen w/o relief. No surgical hx, only uterine polyp removal.      In person sign language: rigo @650pm;   Family: [phone]

## 2021-08-07 NOTE — ED PROVIDER NOTE - PROGRESS NOTE DETAILS
CT and US findings discussed with patient and family member. Tolerating PO. Will treat empirically for a tick borne disease. Reports improvement.

## 2021-08-08 VITALS
RESPIRATION RATE: 20 BRPM | OXYGEN SATURATION: 96 % | DIASTOLIC BLOOD PRESSURE: 50 MMHG | HEART RATE: 89 BPM | SYSTOLIC BLOOD PRESSURE: 113 MMHG | TEMPERATURE: 98 F

## 2021-08-08 LAB
CULTURE RESULTS: SIGNIFICANT CHANGE UP
SPECIMEN SOURCE: SIGNIFICANT CHANGE UP

## 2021-08-08 RX ORDER — OMEPRAZOLE 10 MG/1
1 CAPSULE, DELAYED RELEASE ORAL
Qty: 14 | Refills: 0
Start: 2021-08-08 | End: 2021-08-21

## 2021-08-08 RX ORDER — ONDANSETRON 8 MG/1
4 TABLET, FILM COATED ORAL ONCE
Refills: 0 | Status: COMPLETED | OUTPATIENT
Start: 2021-08-08 | End: 2021-08-08

## 2021-08-08 RX ORDER — POLYETHYLENE GLYCOL 3350 17 G/17G
17 POWDER, FOR SOLUTION ORAL
Qty: 119 | Refills: 0
Start: 2021-08-08 | End: 2021-08-14

## 2021-08-08 RX ADMIN — Medication 100 MILLIGRAM(S): at 00:10

## 2021-08-08 RX ADMIN — FAMOTIDINE 20 MILLIGRAM(S): 10 INJECTION INTRAVENOUS at 00:10

## 2021-08-08 RX ADMIN — Medication 650 MILLIGRAM(S): at 00:10

## 2021-08-08 RX ADMIN — ONDANSETRON 4 MILLIGRAM(S): 8 TABLET, FILM COATED ORAL at 02:08

## 2021-08-09 LAB
B BURGDOR C6 AB SER-ACNC: NEGATIVE — SIGNIFICANT CHANGE UP
B BURGDOR IGG+IGM SER-ACNC: 0.42 INDEX — SIGNIFICANT CHANGE UP (ref 0.01–0.89)
CULTURE RESULTS: SIGNIFICANT CHANGE UP
SPECIMEN SOURCE: SIGNIFICANT CHANGE UP

## 2021-08-10 LAB
A PHAGOCYTOPH DNA BLD QL NAA+PROBE: NEGATIVE — SIGNIFICANT CHANGE UP
E CHAFFEENSIS DNA BLD QL NAA+PROBE: NEGATIVE — SIGNIFICANT CHANGE UP
E EWINGII DNA SPEC QL NAA+PROBE: NEGATIVE — SIGNIFICANT CHANGE UP
EHRLICHIA DNA SPEC QL NAA+PROBE: NEGATIVE — SIGNIFICANT CHANGE UP

## 2021-08-10 NOTE — ED POST DISCHARGE NOTE - RESULT SUMMARY
culture grew 3 or more types of organisms  which indicate collection contamination, consider recollection only if clinically indicated. No antibiotic listed in ED provider note or prescription writer at time of discharge. Patient contact # [phone] left call back PA # and hrs with patient's  for return call. NO alt # listed.

## 2021-08-12 LAB — B BURGDOR DNA SPEC QL NAA+PROBE: NEGATIVE — SIGNIFICANT CHANGE UP

## 2021-08-19 PROCEDURE — 99214 OFFICE O/P EST MOD 30 MIN: CPT | Mod: 95

## 2021-12-17 PROCEDURE — 99214 OFFICE O/P EST MOD 30 MIN: CPT | Mod: 95

## 2022-03-18 PROCEDURE — 99214 OFFICE O/P EST MOD 30 MIN: CPT | Mod: 95

## 2022-07-22 PROCEDURE — 99214 OFFICE O/P EST MOD 30 MIN: CPT | Mod: 95

## 2023-02-22 NOTE — ED PROVIDER NOTE - CADM POA CENTRAL LINE
Well controlled, continue current regimen, continue to check twice weekly, healthy diet and exercise, low salt diet   No

## 2023-04-05 NOTE — ED BEHAVIORAL HEALTH ASSESSMENT NOTE - RECENT MEMORY
Monitor your blood pressure (BP) and heart rate (HR) at home.  Goal for your BP, for now, is < 140/90. Please call the office in 2 weeks with your BP numbers, as we may need to adjust your medication. As always, you should review all possible adverse effects of any medication, prior to taking it.     Please return for a follow-up appointment and wellness visit in 1 month, earlier if any question or concern.     Hand Surgery  Dr. Gustavo Kemp ([place]) [phone]     Please return or seek medical attention if symptoms persist, change, worsen, or return. For emergencies, call 9-1-1 or go to the nearest Emergency Room.    Normal

## 2023-05-10 ENCOUNTER — APPOINTMENT (OUTPATIENT)
Dept: MAMMOGRAPHY | Facility: IMAGING CENTER | Age: 48
End: 2023-05-10
Payer: COMMERCIAL

## 2023-05-10 ENCOUNTER — APPOINTMENT (OUTPATIENT)
Dept: ULTRASOUND IMAGING | Facility: IMAGING CENTER | Age: 48
End: 2023-05-10
Payer: COMMERCIAL

## 2023-05-10 ENCOUNTER — OUTPATIENT (OUTPATIENT)
Dept: OUTPATIENT SERVICES | Facility: HOSPITAL | Age: 48
LOS: 1 days | End: 2023-05-10
Payer: COMMERCIAL

## 2023-05-10 DIAGNOSIS — Z00.8 ENCOUNTER FOR OTHER GENERAL EXAMINATION: ICD-10-CM

## 2023-05-10 DIAGNOSIS — Z98.89 OTHER SPECIFIED POSTPROCEDURAL STATES: Chronic | ICD-10-CM

## 2023-05-10 PROCEDURE — G0279: CPT | Mod: 26

## 2023-05-10 PROCEDURE — G0279: CPT

## 2023-05-10 PROCEDURE — 76641 ULTRASOUND BREAST COMPLETE: CPT | Mod: 26,50

## 2023-05-10 PROCEDURE — 77066 DX MAMMO INCL CAD BI: CPT | Mod: 26

## 2023-05-10 PROCEDURE — 77066 DX MAMMO INCL CAD BI: CPT

## 2023-05-10 PROCEDURE — 76641 ULTRASOUND BREAST COMPLETE: CPT

## 2023-09-25 NOTE — ED BEHAVIORAL HEALTH ASSESSMENT NOTE - AXIS IV
CHIEF COMPLAINT:  Annual exam.     HISTORY OF PRESENT ILLNESS:  Jodi is a 53 year old       female, seen today for annual gynecologic exam.  She is having no problems.  She sees doctors over at [facility] for her hyperlipidemia and type 2 diabetes and hypothyroidism.    Patient's last menstrual period was 2015.  Contraception:  NA  I have reviewed the patient's past medical and surgical history, family history, medications, allergies, social history, and health maintenance recommendations, updating these as appropriate.  See the EMR (electronic medical record) for details of this information.    Pertinent gynecologic history includes:      Abdominal hysterectomy many years ago for a large fibroid uterus.    SOCIAL HISTORY:    Tobacco Use: Never              Alcohol Use: No                 Drug Use:    No               .  She works for a  cleaning service.    REVIEW OF SYSTEMS:    Negative for any significant problems with the following:    General:  Fevers or chills.  Cardiovascular:  Chest pains.  Respiratory:  Shortness of breath.  Breasts:  Dominant masses or nipple discharge.  Gastrointestinal:  Nausea/vomiting, diarrhea/constipation.  Urinary:  Dysuria/hematuria.  Reproductive:  Vaginal discharge, burning, odor or itching.  Skin:  New or changing nevi.  Neurologic:  Headaches.  Musculoskeletal:  New aches or pains.    Preventative Medicine:  Mammogram:  Done today  Pap smear:  n/a  Colonoscopy:  Up to date    Lipid panel/diabetes screening:  Up to date  DEXA scan:  n/a    PHYSICAL EXAM:  Visit Vitals  /74   Ht 4' 10.5\" (1.486 m)   Wt 77 kg (169 lb 12.1 oz)   LMP 2015   BMI 34.87 kg/m²   , Body mass index is 34.87 kg/m².  General:  Well developed, well nourished, in no acute distress.  Psychiatric:  Alert and cooperative; normal mood and affect.  HEENT:  Atraumatic, normocephalic.  Cardiovascular:  Regular rate and rhythm.  Respiratory:  Clear to auscultation bilaterally.   Unlabored respiratory effort.  Breasts:  Symmetric.  There are no skin changes, dominant masses, nipple discharge, or axillary lymphadenopathy bilaterally.  Abdomen:  Abdomen soft and non-tender without masses, hepatosplenomegaly or hernias noted.  Neurologic:  No focal deficits.   Skin:  Warm and dry without lesions.  Extremities:  Without edema or cyanosis.    PELVIC EXAM:  External Genitalia:  Without lesions.  Urethral Meatus:  Appears normal.  Vagina:  Within normal limits.  Cervix/Uterus:  Vaginal walls and cuff are without lesions.  On bimanual exam there are no pelvic masses or tenderness.  Adnexa/Parametria:  No adnexal masses or tenderness appreciated.  Anus and Perineum:  Sphincter tone normal and no mass noted.    GYNECOLOGIC EXAM:  Normal annual exam    ASSESSMENT AND PLAN:  Return p.r.n. or yearly.     None known

## 2023-11-17 NOTE — ED ADULT TRIAGE NOTE - TEMPERATURE IN FAHRENHEIT (DEGREES F)
Follow up with your primary care provider in 1-2 weeks of discharge.    Follow up with orthopedic surgery on discharge. 98.1

## 2024-05-02 NOTE — ED PROVIDER NOTE - TEMPLATE, MLM
Learning About Lung Cancer Screening  What is screening for lung cancer?     Lung cancer screening is a way to find some lung cancers early, before a person has any symptoms of the cancer.  Lung cancer screening may help those who have the highest risk for lung cancer--people age 50 and older who are or were heavy smokers. For most people, who aren't at increased risk, screening for lung cancer probably isn't helpful.  Screening won't prevent cancer. And it may not find all lung cancers. Lung cancer screening may lower the risk of dying from lung cancer in a small number of people.  How is it done?  Lung cancer screening is done with a low-dose CT (computed tomography) scan. A CT scan uses X-rays, or radiation, to make detailed pictures of your body. Experts recommend that screening be done in medical centers that focus on finding and treating lung cancer.  Who is screening recommended for?  Lung cancer screening is recommended for people age 50 and older who are or were heavy smokers. That means people with a smoking history of at least 20 pack years. A pack year is a way to measure how heavy a smoker you are or were.  To figure out your pack years, multiply how many packs a day on average (assuming 20 cigarettes per pack) you have smoked by how many years you have smoked. For example:  If you smoked 1 pack a day for 20 years, that's 1 times 20. So you have a smoking history of 20 pack years.  If you smoked 2 packs a day for 10 years, that's 2 times 10. So you have a smoking history of 20 pack years.  Experts agree that screening is for people who have a high risk of lung cancer. But experts don't agree on what high risk means. Some say people age 50 or older with at least a 20-pack-year smoking history are high risk. Others say it's people age 55 or older with a 30-pack-year history.  To see if you could benefit from screening, first find out if you are at high risk for lung cancer. Your doctor can help you  Psych/Behavioral

## 2024-08-30 ENCOUNTER — RESULT REVIEW (OUTPATIENT)
Age: 49
End: 2024-08-30

## 2025-07-02 ENCOUNTER — RESULT REVIEW (OUTPATIENT)
Age: 50
End: 2025-07-02

## 2025-07-02 ENCOUNTER — APPOINTMENT (OUTPATIENT)
Dept: MAMMOGRAPHY | Facility: IMAGING CENTER | Age: 50
End: 2025-07-02
Payer: COMMERCIAL

## 2025-07-02 ENCOUNTER — OUTPATIENT (OUTPATIENT)
Dept: OUTPATIENT SERVICES | Facility: HOSPITAL | Age: 50
LOS: 1 days | End: 2025-07-02
Payer: COMMERCIAL

## 2025-07-02 ENCOUNTER — APPOINTMENT (OUTPATIENT)
Dept: ULTRASOUND IMAGING | Facility: IMAGING CENTER | Age: 50
End: 2025-07-02
Payer: COMMERCIAL

## 2025-07-02 DIAGNOSIS — Z98.89 OTHER SPECIFIED POSTPROCEDURAL STATES: Chronic | ICD-10-CM

## 2025-07-02 DIAGNOSIS — R92.2 INCONCLUSIVE MAMMOGRAM: ICD-10-CM

## 2025-07-02 DIAGNOSIS — N60.19 DIFFUSE CYSTIC MASTOPATHY OF UNSPECIFIED BREAST: ICD-10-CM

## 2025-07-02 PROCEDURE — 77067 SCR MAMMO BI INCL CAD: CPT

## 2025-07-02 PROCEDURE — 77063 BREAST TOMOSYNTHESIS BI: CPT | Mod: 26

## 2025-07-02 PROCEDURE — 77067 SCR MAMMO BI INCL CAD: CPT | Mod: 26

## 2025-07-02 PROCEDURE — 76641 ULTRASOUND BREAST COMPLETE: CPT | Mod: 26,50

## 2025-07-02 PROCEDURE — 77063 BREAST TOMOSYNTHESIS BI: CPT

## 2025-07-02 PROCEDURE — 76641 ULTRASOUND BREAST COMPLETE: CPT
